# Patient Record
Sex: MALE | Race: WHITE | Employment: OTHER | URBAN - METROPOLITAN AREA
[De-identification: names, ages, dates, MRNs, and addresses within clinical notes are randomized per-mention and may not be internally consistent; named-entity substitution may affect disease eponyms.]

---

## 2019-08-21 ENCOUNTER — APPOINTMENT (OUTPATIENT)
Dept: GENERAL RADIOLOGY | Facility: HOSPITAL | Age: 59
End: 2019-08-21
Payer: MEDICAID

## 2019-08-21 ENCOUNTER — HOSPITAL ENCOUNTER (EMERGENCY)
Facility: HOSPITAL | Age: 59
Discharge: HOME | End: 2019-08-21
Attending: EMERGENCY MEDICINE
Payer: MEDICAID

## 2019-08-21 ENCOUNTER — APPOINTMENT (OUTPATIENT)
Dept: MRI IMAGING | Facility: HOSPITAL | Age: 59
End: 2019-08-21
Attending: EMERGENCY MEDICINE
Payer: MEDICAID

## 2019-08-21 VITALS
DIASTOLIC BLOOD PRESSURE: 94 MMHG | BODY MASS INDEX: 23.8 KG/M2 | RESPIRATION RATE: 31 BRPM | OXYGEN SATURATION: 96 % | SYSTOLIC BLOOD PRESSURE: 172 MMHG | HEIGHT: 71 IN | TEMPERATURE: 98 F | HEART RATE: 70 BPM | WEIGHT: 170 LBS

## 2019-08-21 DIAGNOSIS — F10.20 ALCOHOLISM (HCC): ICD-10-CM

## 2019-08-21 DIAGNOSIS — H53.2 DOUBLE VISION: Primary | ICD-10-CM

## 2019-08-21 DIAGNOSIS — I10 HYPERTENSION, UNSPECIFIED TYPE: ICD-10-CM

## 2019-08-21 LAB
ANION GAP SERPL CALC-SCNC: 9 MMOL/L (ref 0–18)
BASOPHILS # BLD AUTO: 0.04 X10(3) UL (ref 0–0.2)
BASOPHILS NFR BLD AUTO: 0.4 %
BUN BLD-MCNC: 10 MG/DL (ref 7–18)
BUN/CREAT SERPL: 11.2 (ref 10–20)
CALCIUM BLD-MCNC: 8.8 MG/DL (ref 8.5–10.1)
CHLORIDE SERPL-SCNC: 93 MMOL/L (ref 98–112)
CO2 SERPL-SCNC: 26 MMOL/L (ref 21–32)
CREAT BLD-MCNC: 0.89 MG/DL (ref 0.7–1.3)
DEPRECATED RDW RBC AUTO: 42.3 FL (ref 35.1–46.3)
EOSINOPHIL # BLD AUTO: 0.24 X10(3) UL (ref 0–0.7)
EOSINOPHIL NFR BLD AUTO: 2.4 %
ERYTHROCYTE [DISTWIDTH] IN BLOOD BY AUTOMATED COUNT: 12.1 % (ref 11–15)
GLUCOSE BLD-MCNC: 109 MG/DL (ref 70–99)
HCT VFR BLD AUTO: 38.1 % (ref 39–53)
HGB BLD-MCNC: 13.3 G/DL (ref 13–17.5)
IMM GRANULOCYTES # BLD AUTO: 0.04 X10(3) UL (ref 0–1)
IMM GRANULOCYTES NFR BLD: 0.4 %
LYMPHOCYTES # BLD AUTO: 1.53 X10(3) UL (ref 1–4)
LYMPHOCYTES NFR BLD AUTO: 15.1 %
MCH RBC QN AUTO: 32.8 PG (ref 26–34)
MCHC RBC AUTO-ENTMCNC: 34.9 G/DL (ref 31–37)
MCV RBC AUTO: 93.8 FL (ref 80–100)
MONOCYTES # BLD AUTO: 1.22 X10(3) UL (ref 0.1–1)
MONOCYTES NFR BLD AUTO: 12.1 %
NEUTROPHILS # BLD AUTO: 7.04 X10 (3) UL (ref 1.5–7.7)
NEUTROPHILS # BLD AUTO: 7.04 X10(3) UL (ref 1.5–7.7)
NEUTROPHILS NFR BLD AUTO: 69.6 %
OSMOLALITY SERPL CALC.SUM OF ELEC: 266 MOSM/KG (ref 275–295)
PLATELET # BLD AUTO: 276 10(3)UL (ref 150–450)
POTASSIUM SERPL-SCNC: 3.7 MMOL/L (ref 3.5–5.1)
RBC # BLD AUTO: 4.06 X10(6)UL (ref 4.3–5.7)
SODIUM SERPL-SCNC: 128 MMOL/L (ref 136–145)
TROPONIN I SERPL-MCNC: <0.045 NG/ML (ref ?–0.04)
WBC # BLD AUTO: 10.1 X10(3) UL (ref 4–11)

## 2019-08-21 PROCEDURE — 99285 EMERGENCY DEPT VISIT HI MDM: CPT

## 2019-08-21 PROCEDURE — 80048 BASIC METABOLIC PNL TOTAL CA: CPT | Performed by: EMERGENCY MEDICINE

## 2019-08-21 PROCEDURE — 85025 COMPLETE CBC W/AUTO DIFF WBC: CPT | Performed by: EMERGENCY MEDICINE

## 2019-08-21 PROCEDURE — 71046 X-RAY EXAM CHEST 2 VIEWS: CPT | Performed by: EMERGENCY MEDICINE

## 2019-08-21 PROCEDURE — 84484 ASSAY OF TROPONIN QUANT: CPT | Performed by: EMERGENCY MEDICINE

## 2019-08-21 PROCEDURE — 85025 COMPLETE CBC W/AUTO DIFF WBC: CPT

## 2019-08-21 PROCEDURE — 36415 COLL VENOUS BLD VENIPUNCTURE: CPT

## 2019-08-21 PROCEDURE — 93005 ELECTROCARDIOGRAM TRACING: CPT

## 2019-08-21 PROCEDURE — 70551 MRI BRAIN STEM W/O DYE: CPT | Performed by: EMERGENCY MEDICINE

## 2019-08-21 PROCEDURE — 93010 ELECTROCARDIOGRAM REPORT: CPT | Performed by: EMERGENCY MEDICINE

## 2019-08-21 RX ORDER — HYDROCHLOROTHIAZIDE 25 MG/1
25 TABLET ORAL DAILY
Qty: 14 TABLET | Refills: 0 | Status: ON HOLD | OUTPATIENT
Start: 2019-08-21 | End: 2019-12-07

## 2019-08-21 NOTE — ED INITIAL ASSESSMENT (HPI)
Pt with c/o blurry vision, being unstable and high blood pressure since Monday. Per family member he's been more confused for the past couple days.

## 2019-08-21 NOTE — ED PROVIDER NOTES
Patient Seen in: [facility] AND [facility] Emergency Department    History   Patient presents with:  Hypertension (cardiovascular)    Stated Complaint: htn    HPI    Patient presents now with symptoms for the past 3 days.   He first noticed this 2 days ago where Current:BP (!) 160/92   Pulse 73   Temp 98.1 °F (36.7 °C) (Oral)   Resp 22   Ht 180.3 cm (5' 11\")   Wt 77.1 kg   SpO2 97%   BMI 23.71 kg/m²         Physical Exam  Constitutional:  Alert, well nourished adult lying in bed in no distress.   Vital signs on a two-week course but I emphasized that this is best done through her primary care physician. The son does have a primary care physician is going to call tomorrow and see if he can get his father into.   I discussed reasons to return the patient is sitt [facility]    Disposition:  Discharge    Follow-up:  Yuri Covarrubias MD  [facility]  [phone]    In 2 days  For re-check    Perez Gil MD  [address] [phone]    In 2 days  F

## 2019-12-02 ENCOUNTER — APPOINTMENT (OUTPATIENT)
Dept: GENERAL RADIOLOGY | Facility: HOSPITAL | Age: 59
DRG: 199 | End: 2019-12-02
Payer: MEDICAID

## 2019-12-02 ENCOUNTER — HOSPITAL ENCOUNTER (INPATIENT)
Facility: HOSPITAL | Age: 59
LOS: 5 days | Discharge: HOME | DRG: 199 | End: 2019-12-07
Admitting: HOSPITALIST
Payer: MEDICAID

## 2019-12-02 DIAGNOSIS — J94.8 HYDROPNEUMOTHORAX: ICD-10-CM

## 2019-12-02 DIAGNOSIS — S22.41XA CLOSED FRACTURE OF MULTIPLE RIBS OF RIGHT SIDE, INITIAL ENCOUNTER: Primary | ICD-10-CM

## 2019-12-02 LAB
ALBUMIN SERPL-MCNC: 3.6 G/DL (ref 3.4–5)
ALBUMIN/GLOB SERPL: 0.9 {RATIO} (ref 1–2)
ALP LIVER SERPL-CCNC: 72 U/L (ref 45–117)
ALT SERPL-CCNC: 14 U/L (ref 16–61)
ANION GAP SERPL CALC-SCNC: 6 MMOL/L (ref 0–18)
ANION GAP SERPL CALC-SCNC: 8 MMOL/L (ref 0–18)
APTT PPP: 34.3 SECONDS (ref 23.2–35.3)
AST SERPL-CCNC: 12 U/L (ref 15–37)
BASOPHILS # BLD AUTO: 0.05 X10(3) UL (ref 0–0.2)
BASOPHILS NFR BLD AUTO: 0.4 %
BILIRUB SERPL-MCNC: 0.3 MG/DL (ref 0.1–2)
BUN BLD-MCNC: 30 MG/DL (ref 7–18)
BUN BLD-MCNC: 32 MG/DL (ref 7–18)
BUN/CREAT SERPL: 23.8 (ref 10–20)
BUN/CREAT SERPL: 27.6 (ref 10–20)
CALCIUM BLD-MCNC: 9.1 MG/DL (ref 8.5–10.1)
CALCIUM BLD-MCNC: 9.1 MG/DL (ref 8.5–10.1)
CHLORIDE SERPL-SCNC: 100 MMOL/L (ref 98–112)
CHLORIDE SERPL-SCNC: 101 MMOL/L (ref 98–112)
CO2 SERPL-SCNC: 25 MMOL/L (ref 21–32)
CO2 SERPL-SCNC: 26 MMOL/L (ref 21–32)
CREAT BLD-MCNC: 1.16 MG/DL (ref 0.7–1.3)
CREAT BLD-MCNC: 1.26 MG/DL (ref 0.7–1.3)
DEPRECATED RDW RBC AUTO: 43.3 FL (ref 35.1–46.3)
EOSINOPHIL # BLD AUTO: 0.36 X10(3) UL (ref 0–0.7)
EOSINOPHIL NFR BLD AUTO: 2.9 %
ERYTHROCYTE [DISTWIDTH] IN BLOOD BY AUTOMATED COUNT: 12.8 % (ref 11–15)
GLOBULIN PLAS-MCNC: 3.8 G/DL (ref 2.8–4.4)
GLUCOSE BLD-MCNC: 135 MG/DL (ref 70–99)
GLUCOSE BLD-MCNC: 98 MG/DL (ref 70–99)
HAV IGM SER QL: 2.3 MG/DL (ref 1.6–2.6)
HCT VFR BLD AUTO: 33.7 % (ref 39–53)
HGB BLD-MCNC: 11.8 G/DL (ref 13–17.5)
IMM GRANULOCYTES # BLD AUTO: 0.1 X10(3) UL (ref 0–1)
IMM GRANULOCYTES NFR BLD: 0.8 %
INR BLD: 1.13 (ref 0.9–1.2)
LYMPHOCYTES # BLD AUTO: 0.47 X10(3) UL (ref 1–4)
LYMPHOCYTES NFR BLD AUTO: 3.8 %
M PROTEIN MFR SERPL ELPH: 7.4 G/DL (ref 6.4–8.2)
MCH RBC QN AUTO: 32.3 PG (ref 26–34)
MCHC RBC AUTO-ENTMCNC: 35 G/DL (ref 31–37)
MCV RBC AUTO: 92.3 FL (ref 80–100)
MONOCYTES # BLD AUTO: 0.73 X10(3) UL (ref 0.1–1)
MONOCYTES NFR BLD AUTO: 5.9 %
MRSA DNA SPEC QL NAA+PROBE: NEGATIVE
NEUTROPHILS # BLD AUTO: 10.57 X10 (3) UL (ref 1.5–7.7)
NEUTROPHILS # BLD AUTO: 10.57 X10(3) UL (ref 1.5–7.7)
NEUTROPHILS NFR BLD AUTO: 86.2 %
OSMOLALITY SERPL CALC.SUM OF ELEC: 282 MOSM/KG (ref 275–295)
OSMOLALITY SERPL CALC.SUM OF ELEC: 285 MOSM/KG (ref 275–295)
PLATELET # BLD AUTO: 366 10(3)UL (ref 150–450)
POTASSIUM SERPL-SCNC: 3.3 MMOL/L (ref 3.5–5.1)
POTASSIUM SERPL-SCNC: 3.5 MMOL/L (ref 3.5–5.1)
PROTHROMBIN TIME: 14.4 SECONDS (ref 11.8–14.5)
RBC # BLD AUTO: 3.65 X10(6)UL (ref 4.3–5.7)
SODIUM SERPL-SCNC: 132 MMOL/L (ref 136–145)
SODIUM SERPL-SCNC: 134 MMOL/L (ref 136–145)
WBC # BLD AUTO: 12.3 X10(3) UL (ref 4–11)

## 2019-12-02 PROCEDURE — 71101 X-RAY EXAM UNILAT RIBS/CHEST: CPT

## 2019-12-02 PROCEDURE — 99223 1ST HOSP IP/OBS HIGH 75: CPT | Performed by: HOSPITALIST

## 2019-12-02 PROCEDURE — 99254 IP/OBS CNSLTJ NEW/EST MOD 60: CPT | Performed by: INTERNAL MEDICINE

## 2019-12-02 RX ORDER — MULTIPLE VITAMINS W/ MINERALS TAB 9MG-400MCG
1 TAB ORAL DAILY
Status: DISCONTINUED | OUTPATIENT
Start: 2019-12-02 | End: 2019-12-07

## 2019-12-02 RX ORDER — MORPHINE SULFATE 4 MG/ML
4 INJECTION, SOLUTION INTRAMUSCULAR; INTRAVENOUS ONCE
Status: COMPLETED | OUTPATIENT
Start: 2019-12-02 | End: 2019-12-02

## 2019-12-02 RX ORDER — ALBUTEROL SULFATE 2.5 MG/3ML
2.5 SOLUTION RESPIRATORY (INHALATION) EVERY 6 HOURS PRN
Status: DISCONTINUED | OUTPATIENT
Start: 2019-12-02 | End: 2019-12-07

## 2019-12-02 RX ORDER — SODIUM CHLORIDE 0.9 % (FLUSH) 0.9 %
3 SYRINGE (ML) INJECTION AS NEEDED
Status: DISCONTINUED | OUTPATIENT
Start: 2019-12-02 | End: 2019-12-07

## 2019-12-02 RX ORDER — ACETAMINOPHEN 325 MG/1
650 TABLET ORAL EVERY 6 HOURS PRN
Status: DISCONTINUED | OUTPATIENT
Start: 2019-12-02 | End: 2019-12-07

## 2019-12-02 RX ORDER — FOLIC ACID 1 MG/1
1 TABLET ORAL DAILY
Status: DISCONTINUED | OUTPATIENT
Start: 2019-12-02 | End: 2019-12-07

## 2019-12-02 RX ORDER — HYDROMORPHONE HYDROCHLORIDE 1 MG/ML
0.4 INJECTION, SOLUTION INTRAMUSCULAR; INTRAVENOUS; SUBCUTANEOUS EVERY 2 HOUR PRN
Status: DISCONTINUED | OUTPATIENT
Start: 2019-12-02 | End: 2019-12-07

## 2019-12-02 RX ORDER — LORAZEPAM 1 MG/1
2 TABLET ORAL
Status: DISCONTINUED | OUTPATIENT
Start: 2019-12-02 | End: 2019-12-04

## 2019-12-02 RX ORDER — MELATONIN
100 ONCE
Status: COMPLETED | OUTPATIENT
Start: 2019-12-02 | End: 2019-12-02

## 2019-12-02 RX ORDER — MELATONIN
100 DAILY
Status: DISCONTINUED | OUTPATIENT
Start: 2019-12-03 | End: 2019-12-07

## 2019-12-02 RX ORDER — LORAZEPAM 2 MG/ML
2 INJECTION INTRAMUSCULAR
Status: DISCONTINUED | OUTPATIENT
Start: 2019-12-02 | End: 2019-12-04

## 2019-12-02 RX ORDER — ACETAMINOPHEN 325 MG/1
650 TABLET ORAL EVERY 4 HOURS PRN
Status: DISCONTINUED | OUTPATIENT
Start: 2019-12-02 | End: 2019-12-07

## 2019-12-02 RX ORDER — MORPHINE SULFATE 4 MG/ML
4 INJECTION, SOLUTION INTRAMUSCULAR; INTRAVENOUS EVERY 2 HOUR PRN
Status: DISCONTINUED | OUTPATIENT
Start: 2019-12-02 | End: 2019-12-07

## 2019-12-02 RX ORDER — MORPHINE SULFATE 2 MG/ML
2 INJECTION, SOLUTION INTRAMUSCULAR; INTRAVENOUS EVERY 2 HOUR PRN
Status: DISCONTINUED | OUTPATIENT
Start: 2019-12-02 | End: 2019-12-07

## 2019-12-02 RX ORDER — METOCLOPRAMIDE HYDROCHLORIDE 5 MG/ML
10 INJECTION INTRAMUSCULAR; INTRAVENOUS EVERY 8 HOURS PRN
Status: DISCONTINUED | OUTPATIENT
Start: 2019-12-02 | End: 2019-12-07

## 2019-12-02 RX ORDER — POTASSIUM CHLORIDE 20 MEQ/1
40 TABLET, EXTENDED RELEASE ORAL EVERY 4 HOURS
Status: COMPLETED | OUTPATIENT
Start: 2019-12-02 | End: 2019-12-02

## 2019-12-02 RX ORDER — HYDROCODONE BITARTRATE AND ACETAMINOPHEN 5; 325 MG/1; MG/1
1 TABLET ORAL EVERY 4 HOURS PRN
Status: DISCONTINUED | OUTPATIENT
Start: 2019-12-02 | End: 2019-12-07

## 2019-12-02 RX ORDER — ONDANSETRON 2 MG/ML
4 INJECTION INTRAMUSCULAR; INTRAVENOUS EVERY 6 HOURS PRN
Status: DISCONTINUED | OUTPATIENT
Start: 2019-12-02 | End: 2019-12-07

## 2019-12-02 RX ORDER — LORAZEPAM 1 MG/1
1 TABLET ORAL
Status: DISCONTINUED | OUTPATIENT
Start: 2019-12-02 | End: 2019-12-04

## 2019-12-02 RX ORDER — LORAZEPAM 2 MG/ML
1 INJECTION INTRAMUSCULAR
Status: DISCONTINUED | OUTPATIENT
Start: 2019-12-02 | End: 2019-12-04

## 2019-12-02 RX ORDER — HEPARIN SODIUM 5000 [USP'U]/ML
5000 INJECTION, SOLUTION INTRAVENOUS; SUBCUTANEOUS EVERY 12 HOURS SCHEDULED
Status: DISCONTINUED | OUTPATIENT
Start: 2019-12-02 | End: 2019-12-07

## 2019-12-02 RX ORDER — HYDROCODONE BITARTRATE AND ACETAMINOPHEN 5; 325 MG/1; MG/1
2 TABLET ORAL EVERY 4 HOURS PRN
Status: DISCONTINUED | OUTPATIENT
Start: 2019-12-02 | End: 2019-12-07

## 2019-12-02 RX ORDER — MORPHINE SULFATE 2 MG/ML
1 INJECTION, SOLUTION INTRAMUSCULAR; INTRAVENOUS EVERY 2 HOUR PRN
Status: DISCONTINUED | OUTPATIENT
Start: 2019-12-02 | End: 2019-12-07

## 2019-12-02 NOTE — ED NOTES
Pt reports falling outside on concrete and hitting R flank on concrete. Denies hitting head or LOC. Skin is intact. Pt complains of pain in R flank and R anterior ribs and pain with deep inspiration.

## 2019-12-02 NOTE — CONSULTS
Pulmonary/Critical Care Consultation Note    HPI:   Lashanda Ambrose is a 60year old male with Patient presents with:  Fall (musculoskeletal, neurologic)    Beny Teixeira MD    Pt is a 7o yo with no reg med care with alcohol and tob abuse who presented to Q2H PRN    Or  morphINE sulfate (PF) 4 MG/ML injection 4 mg, 4 mg, Intravenous, Q2H PRN  ondansetron HCl (ZOFRAN) injection 4 mg, 4 mg, Intravenous, Q6H PRN            Allergies:  No Known Allergies    Social History:  Social History    Socioeconomic Histo abuse  Plan PT/OT   Fall precautions    PTX  Very small  After fall with R side rib fx  5 days post fall and small ptx suggests ptx will not progress  Very small R side effusion  Plan     O2   Pain control   IS   CXR in AM   Follow    ETOH abuse  No hx of

## 2019-12-02 NOTE — ED INITIAL ASSESSMENT (HPI)
Patient \"blacked out\" Thursday night, striking the right side of his ribcage. State it now hurts to touch it or take a deep breath.

## 2019-12-02 NOTE — CONSULTS
[facility] Wendie Calero  JDY:[id number]  IMO:[id number]  LOS:0    Date of Admission:  12/2/2019  Date of Consult:  12/2/2019 pain  Cardiovascular: negative  Gastrointestinal: negative  Genitourinary:negative  Integument/breast: negative  Musculoskeletal:negative  Neurological: negative  Behavioral/Psych: positive for excessive alcohol consumption    Physical Exam:    12/02/19  1 12/02/2019 at 13:05     Approved by (CST): Saurav Knutson MD on 12/02/2019 at 13:14            Impression and Plan:   Patient Active Problem List:     Multiple closed fractures of ribs of right side    Blunt chest trauma, multiple rib fractures with small

## 2019-12-02 NOTE — H&P
[facility]    PATIENT'S NAME: Nithin Archer   ATTENDING PHYSICIAN: Rosana Goss MD   PATIENT ACCOUNT#:   [id number]    LOCATION:  [place] [address]  MEDICAL RECORD #:   [id number]       YOB: 1960  ADMISSION DATE:       12/02 area, upper thoracic area with increased shortness of breath. No wheezing, no cough. He cannot lie on his right side. Other 12-point review of systems negative. PHYSICAL EXAMINATION:    GENERAL:  Alert. Oriented to time, place, and person.   Antionette Kumari

## 2019-12-02 NOTE — ED PROVIDER NOTES
Patient Seen in: [facility] AND [facility] Emergency Department      History   Patient presents with:  Fall (musculoskeletal, neurologic)    Stated Complaint: fall    HPI    26-year-old male on lisinopril with alcoholism who reportedly fell backwards in his base with auscultated crackles and soft tissue crepitus throughout the right anterior lateral posterior rib area with pain to palpation. Abdominal: Soft. There is no tenderness. There is no guarding.   No organomegaly or focal right upper quadrant or left upper (cpt=71101)    Result Date: 12/2/2019  PROCEDURE: XR RIBS WITH CHEST (3 VIEWS), RIGHT (CPT=71101)  COMPARISON: [facility], XR CHEST PA + LAT CHEST (CPT=71046), 8/21/2019, 16:59. INDICATIONS: Mid upper right rib pain post fall 4 days ago. history. Family was updated. No chest tube at this time as well clinically monitor him for any worsening.         I spent a total of 30 minutes of critical care time in obtaining history, performing a physical exam, bedside monitoring of interventions, co

## 2019-12-03 ENCOUNTER — APPOINTMENT (OUTPATIENT)
Dept: GENERAL RADIOLOGY | Facility: HOSPITAL | Age: 59
DRG: 199 | End: 2019-12-03
Attending: SURGERY
Payer: MEDICAID

## 2019-12-03 LAB
ANION GAP SERPL CALC-SCNC: 6 MMOL/L (ref 0–18)
BASOPHILS # BLD AUTO: 0.06 X10(3) UL (ref 0–0.2)
BASOPHILS NFR BLD AUTO: 0.7 %
BUN BLD-MCNC: 35 MG/DL (ref 7–18)
BUN/CREAT SERPL: 26.7 (ref 10–20)
CALCIUM BLD-MCNC: 8.8 MG/DL (ref 8.5–10.1)
CHLORIDE SERPL-SCNC: 100 MMOL/L (ref 98–112)
CO2 SERPL-SCNC: 26 MMOL/L (ref 21–32)
CREAT BLD-MCNC: 1.31 MG/DL (ref 0.7–1.3)
DEPRECATED RDW RBC AUTO: 43.5 FL (ref 35.1–46.3)
EOSINOPHIL # BLD AUTO: 1.01 X10(3) UL (ref 0–0.7)
EOSINOPHIL NFR BLD AUTO: 11.1 %
ERYTHROCYTE [DISTWIDTH] IN BLOOD BY AUTOMATED COUNT: 12.8 % (ref 11–15)
GLUCOSE BLD-MCNC: 92 MG/DL (ref 70–99)
HAV IGM SER QL: 2.2 MG/DL (ref 1.6–2.6)
HCT VFR BLD AUTO: 31.7 % (ref 39–53)
HGB BLD-MCNC: 10.9 G/DL (ref 13–17.5)
IMM GRANULOCYTES # BLD AUTO: 0.08 X10(3) UL (ref 0–1)
IMM GRANULOCYTES NFR BLD: 0.9 %
LYMPHOCYTES # BLD AUTO: 1.69 X10(3) UL (ref 1–4)
LYMPHOCYTES NFR BLD AUTO: 18.5 %
MCH RBC QN AUTO: 32 PG (ref 26–34)
MCHC RBC AUTO-ENTMCNC: 34.4 G/DL (ref 31–37)
MCV RBC AUTO: 93 FL (ref 80–100)
MONOCYTES # BLD AUTO: 0.89 X10(3) UL (ref 0.1–1)
MONOCYTES NFR BLD AUTO: 9.8 %
NEUTROPHILS # BLD AUTO: 5.39 X10 (3) UL (ref 1.5–7.7)
NEUTROPHILS # BLD AUTO: 5.39 X10(3) UL (ref 1.5–7.7)
NEUTROPHILS NFR BLD AUTO: 59 %
OSMOLALITY SERPL CALC.SUM OF ELEC: 282 MOSM/KG (ref 275–295)
PHOSPHATE SERPL-MCNC: 2.9 MG/DL (ref 2.5–4.9)
PLATELET # BLD AUTO: 340 10(3)UL (ref 150–450)
POTASSIUM SERPL-SCNC: 4.3 MMOL/L (ref 3.5–5.1)
POTASSIUM SERPL-SCNC: 4.5 MMOL/L (ref 3.5–5.1)
RBC # BLD AUTO: 3.41 X10(6)UL (ref 4.3–5.7)
SODIUM SERPL-SCNC: 132 MMOL/L (ref 136–145)
WBC # BLD AUTO: 9.1 X10(3) UL (ref 4–11)

## 2019-12-03 PROCEDURE — 99232 SBSQ HOSP IP/OBS MODERATE 35: CPT | Performed by: INTERNAL MEDICINE

## 2019-12-03 PROCEDURE — 71046 X-RAY EXAM CHEST 2 VIEWS: CPT | Performed by: SURGERY

## 2019-12-03 PROCEDURE — 99233 SBSQ HOSP IP/OBS HIGH 50: CPT | Performed by: HOSPITALIST

## 2019-12-03 NOTE — PROGRESS NOTES
[facility]    Progress Note  Edson Dos Santos  [id number]  1    Subjective:   Denies complaints.  chest pain improved  Denies SOB, HA, N/V     Exam:     General: looks well, no acute distress, comfortable and in good spirits  Head: normocepha 12/03/2019    GLU 92 12/03/2019    BILT 0.3 12/02/2019    AST 12 12/02/2019    ALT 14 12/02/2019    INR 1.13 12/02/2019    PTT 34.3 12/02/2019    CA 8.8 12/03/2019    ALB 3.6 12/02/2019    TP 7.4 12/02/2019         Xr Ribs With Chest (3 Views), Right  (cpt

## 2019-12-03 NOTE — PLAN OF CARE
Problem: Patient Centered Care  Goal: Patient preferences are identified and integrated in the patient's plan of care  Description  Interventions:  - What would you like us to know as we care for you? My son helps me at home.   - Provide timely, complete, Problem: MUSCULOSKELETAL - ADULT  Goal: Return mobility to safest level of function  Description  INTERVENTIONS:  - Assess patient stability and activity tolerance for standing, transferring and ambulating w/ or w/o assistive devices  - Assist with trans

## 2019-12-03 NOTE — PLAN OF CARE
Patient alert and oriented, VSS. Gets up to bathroom and uses urinal. No BM today. Scds on. Complaints of rib pain, treated with Norco. Feels no need/refusing to detox inpatient. CIWA controlled. No ativan given. Tolerating diet. Family at bedside.  Lazarus Heys handout, if applicable  - Encourage broncho-pulmonary hygiene including cough, deep breathe, Incentive Spirometry  - Assess the need for suctioning and perform as needed  - Assess and instruct to report SOB or any respiratory difficulty  - Respiratory Ther

## 2019-12-03 NOTE — PROGRESS NOTES
Received patient from [facility] and alert. c/o pain prn pain medication given. denies any sob. vitals in limit.skin is intact. pt able to swallow without any difficulty. vitals in limit. pt son at bed side. updated pt status. will continue to monitor.

## 2019-12-03 NOTE — DIETARY NOTE
ADULT NUTRITION INITIAL ASSESSMENT    Pt is at high nutrition risk. Pt meets severe malnutrition criteria.       CRITERIA FOR MALNUTRITION DIAGNOSIS:  Criteria for severe malnutrition diagnosis: acute illness/injury related to wt loss greater than 7.5% as pt states knows how to do. Recommend continue nutrition supplements upon discharge. - Meals and snacks: Encouraged adequate PO intake and encouraged high jerry/protein supplements between meals.   - Medical Food Supplements-RD added Ensure Enlive BID.  R HYDROcodone-acetaminophen, morphINE sulfate **OR** morphINE sulfate **OR** morphINE sulfate, ondansetron HCl, albuterol sulfate, LORazepam **OR** LORazepam **OR** LORazepam **OR** LORazepam, Metoclopramide HCl    • Heparin Sodium (Porcine)  5,000 Units Sub within 5 days   Cesar Choi RD, LDN, [address]  ([id number])

## 2019-12-03 NOTE — PROGRESS NOTES
[facility]     Progress Note        Aureliano Marcelino Patient Status:  Inpatient    1960 MRN [id number]   [facility] 2W/SW Attending Rubens Ayala Jolly Day # 1 PCP Whitney Jones MD       Subjective:   Pa PRN  LORazepam (ATIVAN) tab 1 mg, 1 mg, Oral, Q1H PRN    Or  LORazepam (ATIVAN) injection 1 mg, 1 mg, Intravenous, Q1H PRN    Or  LORazepam (ATIVAN) tab 2 mg, 2 mg, Oral, Q1H PRN    Or  LORazepam (ATIVAN) injection 2 mg, 2 mg, Intravenous, Q1H PRN  Thiamin 13:05     Approved by (CST): Jean Fletcher MD on 12/02/2019 at 13:14                  Assessment   1. Right pneumothorax  2. Small right pleural effusion  3. Status post fall  4. Multiple right-sided rib fractures  5. EtOH abuse  6.   Hypertension

## 2019-12-04 ENCOUNTER — APPOINTMENT (OUTPATIENT)
Dept: GENERAL RADIOLOGY | Facility: HOSPITAL | Age: 59
DRG: 199 | End: 2019-12-04
Attending: HOSPITALIST
Payer: MEDICAID

## 2019-12-04 LAB
ANION GAP SERPL CALC-SCNC: 5 MMOL/L (ref 0–18)
BASOPHILS # BLD AUTO: 0.03 X10(3) UL (ref 0–0.2)
BASOPHILS NFR BLD AUTO: 0.4 %
BUN BLD-MCNC: 25 MG/DL (ref 7–18)
BUN/CREAT SERPL: 26.6 (ref 10–20)
CALCIUM BLD-MCNC: 9 MG/DL (ref 8.5–10.1)
CHLORIDE SERPL-SCNC: 96 MMOL/L (ref 98–112)
CO2 SERPL-SCNC: 28 MMOL/L (ref 21–32)
CREAT BLD-MCNC: 0.94 MG/DL (ref 0.7–1.3)
DEPRECATED RDW RBC AUTO: 41.7 FL (ref 35.1–46.3)
EOSINOPHIL # BLD AUTO: 0.66 X10(3) UL (ref 0–0.7)
EOSINOPHIL NFR BLD AUTO: 8.2 %
ERYTHROCYTE [DISTWIDTH] IN BLOOD BY AUTOMATED COUNT: 12.5 % (ref 11–15)
GLUCOSE BLD-MCNC: 103 MG/DL (ref 70–99)
HAV IGM SER QL: 2 MG/DL (ref 1.6–2.6)
HCT VFR BLD AUTO: 32 % (ref 39–53)
HGB BLD-MCNC: 10.8 G/DL (ref 13–17.5)
IMM GRANULOCYTES # BLD AUTO: 0.06 X10(3) UL (ref 0–1)
IMM GRANULOCYTES NFR BLD: 0.7 %
LYMPHOCYTES # BLD AUTO: 0.89 X10(3) UL (ref 1–4)
LYMPHOCYTES NFR BLD AUTO: 11.1 %
MCH RBC QN AUTO: 31 PG (ref 26–34)
MCHC RBC AUTO-ENTMCNC: 33.8 G/DL (ref 31–37)
MCV RBC AUTO: 92 FL (ref 80–100)
MONOCYTES # BLD AUTO: 0.78 X10(3) UL (ref 0.1–1)
MONOCYTES NFR BLD AUTO: 9.7 %
NEUTROPHILS # BLD AUTO: 5.61 X10 (3) UL (ref 1.5–7.7)
NEUTROPHILS # BLD AUTO: 5.61 X10(3) UL (ref 1.5–7.7)
NEUTROPHILS NFR BLD AUTO: 69.9 %
OSMOLALITY SERPL CALC.SUM OF ELEC: 273 MOSM/KG (ref 275–295)
PHOSPHATE SERPL-MCNC: 3.1 MG/DL (ref 2.5–4.9)
PLATELET # BLD AUTO: 349 10(3)UL (ref 150–450)
POTASSIUM SERPL-SCNC: 4 MMOL/L (ref 3.5–5.1)
RBC # BLD AUTO: 3.48 X10(6)UL (ref 4.3–5.7)
SODIUM SERPL-SCNC: 129 MMOL/L (ref 136–145)
WBC # BLD AUTO: 8 X10(3) UL (ref 4–11)

## 2019-12-04 PROCEDURE — 99233 SBSQ HOSP IP/OBS HIGH 50: CPT | Performed by: HOSPITALIST

## 2019-12-04 PROCEDURE — 71045 X-RAY EXAM CHEST 1 VIEW: CPT | Performed by: HOSPITALIST

## 2019-12-04 PROCEDURE — 99233 SBSQ HOSP IP/OBS HIGH 50: CPT | Performed by: INTERNAL MEDICINE

## 2019-12-04 NOTE — PROGRESS NOTES
[facility]    Progress Note    Pattie Likes Patient Status:  Inpatient    1960 MRN [id number]   Location [facility] 5SW/SE Attending Juliet Prescott, [address] Day # 2 PCP Redd Kong MD       Subjective:   Vickey Marrero PRN  ondansetron HCl (ZOFRAN) injection 4 mg, 4 mg, Intravenous, Q6H PRN  albuterol sulfate (VENTOLIN) (2.5 MG/3ML) 0.083% nebulizer solution 2.5 mg, 2.5 mg, Nebulization, Q6H PRN  LORazepam (ATIVAN) tab 1 mg, 1 mg, Oral, Q1H PRN    Or  LORazepam (ATIVAN) effusions/hydropneumothorax with improved atelectasis. 3. Subcutaneous emphysema without a significant change. 4. Old healed left rib fractures and distal left clavicle fracture.     Dictated by (CST): Kushal Pearce MD on 12/04/2019 at 7:53     Approved by

## 2019-12-04 NOTE — PROGRESS NOTES
Patient to transfer from 215 to 561. Report given to John Lorna @ [id number]. Medications, labs, plan of care reviewed. All belongings with patient. Nursing provided patient with current list of medications; reviewed purpose and side effects of medications.  Jericho stevenson

## 2019-12-04 NOTE — PROGRESS NOTES
Pulmonary/Critical Care Follow Up Note    HPI:   Jo Patterson is a 60year old male with Patient presents with:  Fall      PCP Daily Burgos MD  Admission Attending Eun Humphries MD    Hospital Day #2    Mild chest discomfort  No sob  No fever  Occ c mg, 100 mg, Oral, Daily  •  multivitamin with minerals (ADULT) tab 1 tablet, 1 tablet, Oral, Daily  •  folic acid (FOLVITE) tab 1 mg, 1 mg, Oral, Daily  •  Metoclopramide HCl (REGLAN) injection 10 mg, 10 mg, Intravenous, Q8H PRN      Allergies:  No Known A

## 2019-12-04 NOTE — PLAN OF CARE
Patient educated and reminded frequently to use incentive spirometry and to cough and deep breathe frequently.        Problem: Patient Centered Care  Goal: Patient preferences are identified and integrated in the patient's plan of care  Description  Interve indicated  - Manage/alleviate anxiety  - Monitor for signs/symptoms of CO2 retention  Outcome: Progressing     Problem: MUSCULOSKELETAL - ADULT  Goal: Return mobility to safest level of function  Description  INTERVENTIONS:  - Assess patient stability and

## 2019-12-04 NOTE — PROGRESS NOTES
[place] [facility]    Progress Note  [facility]  [id number]  2    Subjective:   Denies complaints.  chest pain improved  Denies SOB, HA, N/V     Exam:     General: looks well, no acute distress, comfortable and in good spirits  Head: normocepha 12/04/2019    CA 9.0 12/04/2019         Xr Chest Pa + Lat Chest (cpt=71046)    Result Date: 12/3/2019  CONCLUSION:  1. Multiple right-sided rib fractures with approximately 20% right-sided pneumothorax (unchanged) and small right basilar pleural effusion/h

## 2019-12-04 NOTE — PAYOR COMM NOTE
--------------  ADMISSION REVIEW     Payor: Ela Whitt #:  [id number]  Authorization Number: [id number]    Admit date: 12/2/19  Admit time: 1633       Admitting Physician: Dot Kramer MD  Attending Physician:  Maryln Denver, DO  Primary Car Device None (Room air)       Current:/82   Pulse 84   Temp 98.2 °F (36.8 °C)   Resp 17   Ht 180.3 cm (5' 11\")   Wt 74.8 kg   SpO2 95%   BMI 23.01 kg/m²          Physical Exam    Constitutional: Oriented to person, place, and time.   Appears well-deve PLATELET    Narrative: The following orders were created for panel order CBC WITH DIFFERENTIAL WITH PLATELET.   Procedure                               Abnormality         Status                     ---------                               ----------- fractures of the anterolateral left lower ribs.     Dictated by (CST): Sy Spangler MD on 12/02/2019 at 13:05     Approved by (CST): Sy Spangler MD on 12/02/2019 at 13:14                MDM     Patient very stable here and injury occurred for 5 days ag Agnes Gill MD   PATIENT ACCOUNT#:   [id number]    LOCATION:  [facility] 96 [address] [facility] 68  MEDICAL RECORD #:   [id number]       YOB: 1960  ADMISSION DATE:       12/02/2019    HISTORY AND PHYSICAL EXAMINATION    CHIEF COMPLAINT:  Multiple right-sided He cannot lie on his right side. Other 12-point review of systems negative. PHYSICAL EXAMINATION:    GENERAL:  Alert. Oriented to time, place, and person. Moderate distress.   VITAL SIGNS:  Temperature 98.2, pulse 79, respiratory rate 20, blood pres LAST 1 DAY:  folic acid (FOLVITE) tab 1 mg     Date Action Dose Route User    12/4/2019 0853 Given 1 mg Oral Daryl Sánchez, RN    12/3/2019 1005 Given 1 mg Oral Radha Coburn      Heparin Sodium (Porcine) 5000 UNIT/ML injection 5,000 Units     Date Ac and fell backwards on concrete but did not hurt his head. He is a heavy alcoholic according to his family and had been drinking that day. He was outside going around his building to go down and do laundry when he fell.   He has been complaining of chest p old male with Patient presents with:  Fall (musculoskeletal, neurologic)     Jan Danielle MD     Pt is a 7o yo with no reg med care with alcohol and tob abuse who presented to ER after fall on Thursday and c/o CP and SOB.  Pt was hoping pain would get bett following  Ambulation  DT prophylaxis     Objective:        12/03/19  0400 12/03/19  0500 12/03/19  0600 12/03/19  0624   BP: 141/86 141/81 132/80     Pulse: 74 71 68     Resp: 20 17 17     Temp: 98.1 °F (36.7 °C)         TempSrc:   Temporal       SpO2: 94

## 2019-12-04 NOTE — CM/SW NOTE
MDO to SW for substance abuse/ETOH. SW made referral to psych referral to [facility] via pt rn.    SW/CM to remain available for support and/or discharge planning.      KERRY López, [facility]  Social Work   XEA:#[phone]

## 2019-12-05 ENCOUNTER — APPOINTMENT (OUTPATIENT)
Dept: GENERAL RADIOLOGY | Facility: HOSPITAL | Age: 59
DRG: 199 | End: 2019-12-05
Attending: INTERNAL MEDICINE
Payer: MEDICAID

## 2019-12-05 PROCEDURE — 71045 X-RAY EXAM CHEST 1 VIEW: CPT | Performed by: INTERNAL MEDICINE

## 2019-12-05 PROCEDURE — 99232 SBSQ HOSP IP/OBS MODERATE 35: CPT | Performed by: INTERNAL MEDICINE

## 2019-12-05 PROCEDURE — 99232 SBSQ HOSP IP/OBS MODERATE 35: CPT | Performed by: HOSPITALIST

## 2019-12-05 RX ORDER — IBUPROFEN 600 MG/1
600 TABLET ORAL
Status: DISCONTINUED | OUTPATIENT
Start: 2019-12-05 | End: 2019-12-07

## 2019-12-05 NOTE — PROGRESS NOTES
[facility]    Progress Note    Ladonna  Patient Status:  Inpatient    1960 MRN [id number]   Location [facility] 5SW/SE Attending Niharika Lobo, [address] [facility] Day # 3 PCP Melissa Nunez MD       Subjective:   Shelby Snell injection 4 mg, 4 mg, Intravenous, Q2H PRN  ondansetron HCl (ZOFRAN) injection 4 mg, 4 mg, Intravenous, Q6H PRN  albuterol sulfate (VENTOLIN) (2.5 MG/3ML) 0.083% nebulizer solution 2.5 mg, 2.5 mg, Nebulization, Q6H PRN  Thiamine HCl tab 100 mg, 100 mg, Ora clavicle fracture.     Dictated by (CST): Roz Waite MD on 12/04/2019 at 7:53     Approved by (CST): Roz Waite MD on 12/04/2019 at 8:00                Results:     CBC:    Lab Results   Component Value Date    WBC 8.0 12/04/2019    WBC 9.1 12/03/20

## 2019-12-05 NOTE — PROGRESS NOTES
Pulmonary/Critical Care Follow Up Note    HPI:   Darek Matos is a 60year old male with Patient presents with:  Fall      PCP Davion Jackson MD  Admission Attending Felton Pinedo MD    Hospital Day #3    Mild chest discomfort  No sob  No fever  Occ c injection 10 mg, 10 mg, Intravenous, Q8H PRN      Allergies:  No Known Allergies        PHYSICAL EXAM:   Blood pressure 144/74, pulse 78, temperature 98.5 °F (36.9 °C), temperature source Oral, resp. rate 18, height 5' 10.98\" (1.803 m), weight 147 lb 14. 4

## 2019-12-05 NOTE — PROGRESS NOTES
[facility]    Progress Note  Vidya Karbrianda  [id number]  3    Subjective:   Denies complaints.  chest pain minimal  Taking Norco twice daily  Denies SOB, HA, N/V     Exam:     General: looks well, no acute distress, comfortable and in good related to increase in pleural effusion and atelectasis. 2. Old left-sided rib fractures.     Dictated by (CST): Eugene Mclean MD on 12/03/2019 at 16:07     Approved by (CST): Eugene Mclean MD on 12/03/2019 at 16:29          Xr Chest Ap Portable  (cpt=710

## 2019-12-06 ENCOUNTER — APPOINTMENT (OUTPATIENT)
Dept: GENERAL RADIOLOGY | Facility: HOSPITAL | Age: 59
DRG: 199 | End: 2019-12-06
Attending: INTERNAL MEDICINE
Payer: MEDICAID

## 2019-12-06 PROCEDURE — 99231 SBSQ HOSP IP/OBS SF/LOW 25: CPT | Performed by: INTERNAL MEDICINE

## 2019-12-06 PROCEDURE — 71045 X-RAY EXAM CHEST 1 VIEW: CPT | Performed by: INTERNAL MEDICINE

## 2019-12-06 PROCEDURE — 99232 SBSQ HOSP IP/OBS MODERATE 35: CPT | Performed by: HOSPITALIST

## 2019-12-06 NOTE — CM/SW NOTE
MDO for psych consult for ETOH/Substance abuse. Yehuda Soulier liaison contacted and she will follow up with pt. / to remain available for support and/or discharge planning.      Mago Blake RN    Ext [phone]

## 2019-12-06 NOTE — PROGRESS NOTES
[place] [facility]    Progress Note  Madeline Garcia  [id number]  4    Subjective:   Denies complaints.  chest pain minimal  Denies SOB, HA, N/V   + BM    Exam:     General: looks well, no acute distress, comfortable and in good spirits  Head: normo 12/6/2019  CONCLUSION: Multiple right lateral rib fractures are redemonstrated with stable right basal pleural effusion and there is a stable 15 % right hemithorax pneumothorax. Subcutaneous emphysema over the right lateral chest wall has improved.    Dict

## 2019-12-06 NOTE — PLAN OF CARE
Problem: Patient Centered Care  Goal: Patient preferences are identified and integrated in the patient's plan of care  Description  Interventions:  - What would you like us to know as we care for you? My son helps me at home.   - Provide timely, complete, patient is on 4L O2 via high flow NC as part of the treatment for his pneumothorax. Per MD, patient's SpO2 on room air is good, but the patient needs frequent reminders to keep the nasal cannula on. Tubing was padded for comfort.   Problem: MUSCULOSKELETAL

## 2019-12-06 NOTE — PROGRESS NOTES
Pulmonary/Critical Care Follow Up Note    HPI:   Edinson Lopez is a 60year old male with Patient presents with:  Fall      PCP Yanick Tate MD  Admission Attending Zeny Chacon MD    Hospital Day #4    Mild chest discomfort  No sob  No fever  Occ c injection 10 mg, 10 mg, Intravenous, Q8H PRN      Allergies:  No Known Allergies        PHYSICAL EXAM:   Blood pressure (!) 170/85, pulse 72, temperature 97.3 °F (36.3 °C), temperature source Oral, resp.  rate 16, height 5' 10.98\" (1.803 m), weight 147 lb

## 2019-12-06 NOTE — PROGRESS NOTES
Patient requested to wear jeans while walking in hallway because he was cold. A bottle of lisinopril was found in patient's renu pocket, medication removed from room and locked in .  Note added to patient's care chart about medication and current loca

## 2019-12-06 NOTE — BH PROGRESS NOTE
Rajwinder Alaniz briefly met with Belinda Steele and his family to discuss the provided referrals for CD treatment centers and outpatient therapists that are all in network with his insurance.     Rajwinder Alaniz compiled list of six CD treatment centers that have all lev

## 2019-12-06 NOTE — PROGRESS NOTES
[place] [facility]    Progress Note    Madeline Garcia Patient Status:  Inpatient    1960 MRN [id number]   Location [facility] 5SW/SE Attending Vi Trejo, [address] Day # 4 PCP Danielle Gonzalez MD       Subjective:   Cristel Rice sulfate (PF) 4 MG/ML injection 4 mg, 4 mg, Intravenous, Q2H PRN  ondansetron HCl (ZOFRAN) injection 4 mg, 4 mg, Intravenous, Q6H PRN  albuterol sulfate (VENTOLIN) (2.5 MG/3ML) 0.083% nebulizer solution 2.5 mg, 2.5 mg, Nebulization, Q6H PRN  Thiamine HCl ta MD on 12/05/2019 at 11:03     Approved by (CST): Christi Carrillo MD on 12/05/2019 at 11:07                Results:     CBC:    Lab Results   Component Value Date    WBC 8.0 12/04/2019    WBC 9.1 12/03/2019    WBC 12.3 (H) 12/02/2019     Lab Results   C

## 2019-12-06 NOTE — PHYSICAL THERAPY NOTE
PHYSICAL THERAPY QUICK EVALUATION - INPATIENT    Room Number: 561/561-A  Evaluation Date: 12/6/2019  Presenting Problem: R Rib fractures: 3rd-9th ribs with pneumothorax  Physician Order: PT Eval and Treat    Problem List  Principal Problem:    Closed fra None   -   Need to walk in hospital room?: None   -   Climbing 3-5 steps with a railing?: None       AM-PAC Score:  Raw Score: 24   Approx Degree of Impairment: 0%   Standardized Score (AM-PAC Scale): 61.14   CMS Modifier (G-Code): CH      FUNCTIONAL ABILI

## 2019-12-07 ENCOUNTER — APPOINTMENT (OUTPATIENT)
Dept: GENERAL RADIOLOGY | Facility: HOSPITAL | Age: 59
DRG: 199 | End: 2019-12-07
Attending: INTERNAL MEDICINE
Payer: MEDICAID

## 2019-12-07 VITALS
BODY MASS INDEX: 20.7 KG/M2 | HEIGHT: 70.98 IN | SYSTOLIC BLOOD PRESSURE: 146 MMHG | DIASTOLIC BLOOD PRESSURE: 84 MMHG | HEART RATE: 87 BPM | RESPIRATION RATE: 18 BRPM | OXYGEN SATURATION: 96 % | WEIGHT: 147.88 LBS | TEMPERATURE: 98 F

## 2019-12-07 LAB
ANION GAP SERPL CALC-SCNC: 8 MMOL/L (ref 0–18)
BUN BLD-MCNC: 18 MG/DL (ref 7–18)
BUN/CREAT SERPL: 22 (ref 10–20)
CALCIUM BLD-MCNC: 9 MG/DL (ref 8.5–10.1)
CHLORIDE SERPL-SCNC: 100 MMOL/L (ref 98–112)
CO2 SERPL-SCNC: 25 MMOL/L (ref 21–32)
CREAT BLD-MCNC: 0.82 MG/DL (ref 0.7–1.3)
GLUCOSE BLD-MCNC: 102 MG/DL (ref 70–99)
OSMOLALITY SERPL CALC.SUM OF ELEC: 278 MOSM/KG (ref 275–295)
POTASSIUM SERPL-SCNC: 4.5 MMOL/L (ref 3.5–5.1)
SODIUM SERPL-SCNC: 133 MMOL/L (ref 136–145)

## 2019-12-07 PROCEDURE — 71045 X-RAY EXAM CHEST 1 VIEW: CPT | Performed by: INTERNAL MEDICINE

## 2019-12-07 PROCEDURE — 99232 SBSQ HOSP IP/OBS MODERATE 35: CPT | Performed by: INTERNAL MEDICINE

## 2019-12-07 PROCEDURE — 99239 HOSP IP/OBS DSCHRG MGMT >30: CPT | Performed by: HOSPITALIST

## 2019-12-07 RX ORDER — MELATONIN
100 DAILY
Qty: 30 TABLET | Refills: 0 | Status: SHIPPED | OUTPATIENT
Start: 2019-12-08

## 2019-12-07 RX ORDER — ALBUTEROL SULFATE 90 UG/1
AEROSOL, METERED RESPIRATORY (INHALATION)
Qty: 1 INHALER | Refills: 5 | Status: SHIPPED | OUTPATIENT
Start: 2019-12-07

## 2019-12-07 RX ORDER — HYDROCODONE BITARTRATE AND ACETAMINOPHEN 5; 325 MG/1; MG/1
1 TABLET ORAL EVERY 4 HOURS PRN
Qty: 30 TABLET | Refills: 0 | Status: SHIPPED | OUTPATIENT
Start: 2019-12-07 | End: 2020-09-25

## 2019-12-07 RX ORDER — IBUPROFEN 600 MG/1
600 TABLET ORAL
Qty: 39 TABLET | Refills: 0 | Status: SHIPPED | OUTPATIENT
Start: 2019-12-07

## 2019-12-07 RX ORDER — HYDRALAZINE HYDROCHLORIDE 20 MG/ML
10 INJECTION INTRAMUSCULAR; INTRAVENOUS EVERY 4 HOURS PRN
Status: DISCONTINUED | OUTPATIENT
Start: 2019-12-07 | End: 2019-12-07

## 2019-12-07 RX ORDER — FOLIC ACID 1 MG/1
1 TABLET ORAL DAILY
Qty: 30 TABLET | Refills: 0 | Status: SHIPPED | OUTPATIENT
Start: 2019-12-08

## 2019-12-07 NOTE — PLAN OF CARE
Problem: Patient Centered Care  Goal: Patient preferences are identified and integrated in the patient's plan of care  Description  Interventions:  - What would you like us to know as we care for you? My son helps me at home.   - Provide timely, complete, retention  Outcome: Adequate for Discharge     Problem: MUSCULOSKELETAL - ADULT  Goal: Return mobility to safest level of function  Description  INTERVENTIONS:  - Assess patient stability and activity tolerance for standing, transferring and ambulating w/

## 2019-12-07 NOTE — PLAN OF CARE
Patient resting in bed comfortably. Rib pain relieved with PRN doses of norco. Patient alert and oriented x 4. Patient denied shortness of breath. 4L of oxygen, high flow nasal cannula.  One call from tele, patient's heart rate ranging from 130-150's when a RESPIRATORY - ADULT  Goal: Achieves optimal ventilation and oxygenation  Description  INTERVENTIONS:  - Assess for changes in respiratory status  - Assess for changes in mentation and behavior  - Position to facilitate oxygenation and minimize respiratory

## 2019-12-07 NOTE — PROGRESS NOTES
[facility]    Progress Note      Assessment and Plan:   1.  Rib fractures with modest pneumothorax and small right pleural effusion–the patient is well clinically. Underlying tobacco use.     Recommendations: Okay to discharge home, smoking

## 2019-12-07 NOTE — PROGRESS NOTES
Pt has order for discharge to home. PT dressed self and no longer has IV site. Discharge instructions reviewed with pt and scripts from Dr. Galo Choe given. Pts own med (Lisinopril) returned to him. Patient understanding of discharge instructions.  Son here

## 2019-12-08 NOTE — DISCHARGE SUMMARY
[place] [facility]    Discharge Summary    Eric Kennedy Patient Status:  Inpatient    1960 MRN [id number]   Location [facility] 5SW/SE Attending No att. providers found   [facility] Day # 5 PCP Jamni Chong MD     Date of Admission: still pending. X-ray of the ribs showed extensive displaced posterolateral right rib fractures extending from the third to the ninth ribs, subcutaneous emphysema on the right posterior chest and neck base.   There is pneumothorax that is small at the apex Tabs      Take 1 tablet (100 mg total) by mouth daily. Quantity:  30 tablet  Refills:  0        CONTINUE taking these medications      Instructions Prescription details   Lisinopril-hydroCHLOROthiazide 10-12.5 MG Tabs      Take 1 tablet by mouth daily.

## 2019-12-09 NOTE — PAYOR COMM NOTE
--------------  DISCHARGE REVIEW    Payor: Mago Patelbrandie #:  [id number]  Authorization Number: [id number]    Admit date: 12/2/19  Admit time:  6478  Discharge Date: 12/7/2019  3:40 PM     Admitting Physician: Mary Dempsey MD  Attending Physician:  Avery Bazan bilaterally  Cardiovascular: S1, S2 normal, no murmur, click, rub or gallop, regular rate and rhythm  Abdominal: soft, non-tender; bowel sounds normal; no masses,  no organomegaly  Extremities: extremities normal, atraumatic, no cyanosis or edema  Psychiat Prescription details   Albuterol Sulfate  (90 Base) MCG/ACT Aers      inhale 2 puff by inhalation route  every 4 hours as needed   Quantity:  1 Inhaler  Refills:  5     folic acid 1 MG Tabs  Commonly known as:  FOLVITE      Take 1 tablet (1 mg total

## 2019-12-18 NOTE — CDS QUERY
Potential for Impaired Nutritional Status  DOCUMENTATION CLARIFICATION FORM  Dear Doctor:  Clinical information suggests potential for impaired nutritional status.  For accurate ICD-10-CM code assignment to reflect severity of illness and risk of mortality, appears unbalanced and low in fruits, vegetables, fiber, nutrients, minerals and high in Na+ . Pt relates was surprised to hear how much he weighs. Does not weigh himself and had not been to MD offfice to know scaled weight.  States thought his weight was 1 Fat/Muscle Mass: moderate depletion body fat triceps region and fat overlying rib cage region and moderate depletion muscle mass temple region, clavicle region, scapular region, shoulder region, dorsal don region, thigh region and calf region per visual

## 2020-04-08 ENCOUNTER — APPOINTMENT (OUTPATIENT)
Dept: CT IMAGING | Facility: HOSPITAL | Age: 60
End: 2020-04-08
Attending: EMERGENCY MEDICINE
Payer: MEDICAID

## 2020-04-08 ENCOUNTER — HOSPITAL ENCOUNTER (EMERGENCY)
Facility: HOSPITAL | Age: 60
Discharge: HOME | End: 2020-04-08
Attending: EMERGENCY MEDICINE
Payer: MEDICAID

## 2020-04-08 VITALS
DIASTOLIC BLOOD PRESSURE: 104 MMHG | RESPIRATION RATE: 20 BRPM | TEMPERATURE: 97 F | OXYGEN SATURATION: 98 % | HEIGHT: 71 IN | HEART RATE: 78 BPM | WEIGHT: 150 LBS | SYSTOLIC BLOOD PRESSURE: 157 MMHG | BODY MASS INDEX: 21 KG/M2

## 2020-04-08 DIAGNOSIS — S40.012A CONTUSION OF LEFT SCAPULA, INITIAL ENCOUNTER: Primary | ICD-10-CM

## 2020-04-08 DIAGNOSIS — R91.1 PULMONARY NODULE: ICD-10-CM

## 2020-04-08 PROCEDURE — 85025 COMPLETE CBC W/AUTO DIFF WBC: CPT | Performed by: EMERGENCY MEDICINE

## 2020-04-08 PROCEDURE — 71250 CT THORAX DX C-: CPT | Performed by: EMERGENCY MEDICINE

## 2020-04-08 PROCEDURE — 36415 COLL VENOUS BLD VENIPUNCTURE: CPT

## 2020-04-08 PROCEDURE — 83735 ASSAY OF MAGNESIUM: CPT | Performed by: EMERGENCY MEDICINE

## 2020-04-08 PROCEDURE — 99284 EMERGENCY DEPT VISIT MOD MDM: CPT

## 2020-04-08 PROCEDURE — 80076 HEPATIC FUNCTION PANEL: CPT | Performed by: EMERGENCY MEDICINE

## 2020-04-08 PROCEDURE — 80048 BASIC METABOLIC PNL TOTAL CA: CPT | Performed by: EMERGENCY MEDICINE

## 2020-04-08 PROCEDURE — 70450 CT HEAD/BRAIN W/O DYE: CPT | Performed by: EMERGENCY MEDICINE

## 2020-04-08 NOTE — ED PROVIDER NOTES
Patient Seen in: [facility] AND [facility] Emergency Department      History   Patient presents with:  Shoulder Injury    Stated Complaint: left shoulder injury    HPI    79-year-old male seen a couple months ago with history of alcohol abuse and fall with multi Normocephalic and atraumatic. Eyes: Conjunctivae are normal. Pupils are equal, round, and reactive to light. Small artery healing left lateral superior periorbital laceration without signs of infection. Neck: Normal range of motion. Neck supple.   No mi -----------         ------                     CBC W/ DIFFERENTIAL[[id number]]          Abnormal            Final result                 Please view results for these tests on the individual orders.           Ct Chest (cpt=71306)    Result D thickening suggestive of chronic airway inflammation. 0.5 cm nodule is seen within the left upper lobe abutting the left major fissure (series 10, image  34). CHEST WALL/BONES: There is degenerative disease of the thoracic spine.   There are multiple jax prominent but symmetric consistent with mild to moderate generalized atrophy, unchanged. No hydrocephalus, subarachnoid hemorrhage, or mass. No midline shift. CEREBRUM: Excessive generalized atrophy is present for a patient of this age.   No edema, hemor unremarkable, slightly low bicarb from nml, pt appears slightly dehydrated. Will have increase fluid intake. No signs of EtOH withdrawal.  Patient adamantly wants to go home.   Instructed on close follow-up and return in wound care for the left periorbita

## 2020-04-08 NOTE — ED INITIAL ASSESSMENT (HPI)
Patient tripped and fell on his patient today striking the left side of his head and left shoulder. Denies LOC, and is not c/o head pain, but is having pain in his left scapula. Denies the use of blood thinners.

## 2020-04-08 NOTE — ED NOTES
Patient verbalizes understanding of discharge instructions and follow up. Patient ambulatory and stable upon discharge.

## 2021-04-12 ENCOUNTER — HOSPITAL ENCOUNTER (EMERGENCY)
Facility: HOSPITAL | Age: 61
Discharge: HOME | End: 2021-04-12
Attending: EMERGENCY MEDICINE
Payer: MEDICAID

## 2021-04-12 ENCOUNTER — APPOINTMENT (OUTPATIENT)
Dept: CT IMAGING | Facility: HOSPITAL | Age: 61
End: 2021-04-12
Attending: EMERGENCY MEDICINE
Payer: MEDICAID

## 2021-04-12 VITALS
RESPIRATION RATE: 18 BRPM | HEART RATE: 94 BPM | BODY MASS INDEX: 21 KG/M2 | OXYGEN SATURATION: 100 % | TEMPERATURE: 98 F | DIASTOLIC BLOOD PRESSURE: 100 MMHG | WEIGHT: 150 LBS | HEIGHT: 71 IN | SYSTOLIC BLOOD PRESSURE: 162 MMHG

## 2021-04-12 DIAGNOSIS — S30.0XXA CONTUSION OF LOWER BACK, INITIAL ENCOUNTER: Primary | ICD-10-CM

## 2021-04-12 PROCEDURE — 74176 CT ABD & PELVIS W/O CONTRAST: CPT | Performed by: EMERGENCY MEDICINE

## 2021-04-12 PROCEDURE — 99284 EMERGENCY DEPT VISIT MOD MDM: CPT

## 2021-04-12 RX ORDER — HYDROCODONE BITARTRATE AND ACETAMINOPHEN 5; 325 MG/1; MG/1
1 TABLET ORAL EVERY 6 HOURS PRN
Qty: 10 TABLET | Refills: 0 | Status: SHIPPED | OUTPATIENT
Start: 2021-04-12

## 2021-04-12 NOTE — ED INITIAL ASSESSMENT (HPI)
Pt reports falling two days ago after a couple of drinks, denies LOC, denies head trauma. Reports pain to right lower back and right hip. Ambulatory in triage.

## 2021-04-12 NOTE — ED PROVIDER NOTES
Patient Seen in: [facility] AND [facility] Emergency Department      History   Patient presents with:  Fall    Stated Complaint:     HPI/Subjective:   HPI    69-year-old male with onto a piece of furniture with complaints of severe right low back pain without ra Cardiovascular: Normal rate, regular rhythm and intact distal pulses. Pulmonary/Chest: Effort normal. No respiratory distress. Abdominal: Soft. There is no tenderness. There is no guarding.    Musculoskeletal: No visible signs of trauma to the low ba SPLEEN: Unremarkable PANCREAS: No gross ductal dilation. ADRENALS: Unremarkable KIDNEYS: No radiopaque renal calculus or hydronephrosis. Punctate nonspecific parenchymal calcification seen within the right interpolar kidney.  AORTA/VASCULAR:   There is ath 5-325 MG Oral Tab  Take 1 tablet by mouth every 6 (six) hours as needed.   Qty: 10 tablet Refills: 0

## 2023-09-12 ENCOUNTER — APPOINTMENT (OUTPATIENT)
Dept: CT IMAGING | Facility: HOSPITAL | Age: 63
End: 2023-09-12
Payer: MEDICAID

## 2023-09-12 ENCOUNTER — APPOINTMENT (OUTPATIENT)
Dept: GENERAL RADIOLOGY | Facility: HOSPITAL | Age: 63
End: 2023-09-12
Attending: EMERGENCY MEDICINE
Payer: MEDICAID

## 2023-09-12 ENCOUNTER — HOSPITAL ENCOUNTER (INPATIENT)
Facility: HOSPITAL | Age: 63
LOS: 4 days | Discharge: HOME HEALTH | End: 2023-09-16
Attending: EMERGENCY MEDICINE | Admitting: HOSPITALIST
Payer: MEDICAID

## 2023-09-12 ENCOUNTER — APPOINTMENT (OUTPATIENT)
Dept: CT IMAGING | Facility: HOSPITAL | Age: 63
End: 2023-09-12
Attending: EMERGENCY MEDICINE
Payer: MEDICAID

## 2023-09-12 DIAGNOSIS — I63.9 ACUTE CVA (CEREBROVASCULAR ACCIDENT) (HCC): Primary | ICD-10-CM

## 2023-09-12 DIAGNOSIS — S42.215A CLOSED NONDISPLACED FRACTURE OF SURGICAL NECK OF LEFT HUMERUS, UNSPECIFIED FRACTURE MORPHOLOGY, INITIAL ENCOUNTER: ICD-10-CM

## 2023-09-12 DIAGNOSIS — G31.9 CEREBRAL ATROPHY (HCC): ICD-10-CM

## 2023-09-12 LAB
ALBUMIN SERPL-MCNC: 3.5 G/DL (ref 3.4–5)
ALBUMIN/GLOB SERPL: 1.2 {RATIO} (ref 1–2)
ALP LIVER SERPL-CCNC: 71 U/L
ALT SERPL-CCNC: 17 U/L
ANION GAP SERPL CALC-SCNC: 7 MMOL/L (ref 0–18)
AST SERPL-CCNC: 12 U/L (ref 15–37)
ATRIAL RATE: 60 BPM
BASOPHILS # BLD AUTO: 0.06 X10(3) UL (ref 0–0.2)
BASOPHILS NFR BLD AUTO: 0.7 %
BILIRUB SERPL-MCNC: 0.3 MG/DL (ref 0.1–2)
BUN BLD-MCNC: 17 MG/DL (ref 7–18)
BUN/CREAT SERPL: 13.8 (ref 10–20)
CALCIUM BLD-MCNC: 8.3 MG/DL (ref 8.5–10.1)
CHLORIDE SERPL-SCNC: 109 MMOL/L (ref 98–112)
CHOLEST SERPL-MCNC: 186 MG/DL (ref ?–200)
CO2 SERPL-SCNC: 25 MMOL/L (ref 21–32)
CREAT BLD-MCNC: 1.23 MG/DL
DEPRECATED RDW RBC AUTO: 46.6 FL (ref 35.1–46.3)
EGFRCR SERPLBLD CKD-EPI 2021: 66 ML/MIN/1.73M2 (ref 60–?)
EOSINOPHIL # BLD AUTO: 0.24 X10(3) UL (ref 0–0.7)
EOSINOPHIL NFR BLD AUTO: 2.7 %
ERYTHROCYTE [DISTWIDTH] IN BLOOD BY AUTOMATED COUNT: 14.3 % (ref 11–15)
EST. AVERAGE GLUCOSE BLD GHB EST-MCNC: 120 MG/DL (ref 68–126)
GLOBULIN PLAS-MCNC: 2.9 G/DL (ref 2.8–4.4)
GLUCOSE BLD-MCNC: 116 MG/DL (ref 70–99)
GLUCOSE BLDC GLUCOMTR-MCNC: 113 MG/DL (ref 70–99)
GLUCOSE BLDC GLUCOMTR-MCNC: 159 MG/DL (ref 70–99)
HBA1C MFR BLD: 5.8 % (ref ?–5.7)
HCT VFR BLD AUTO: 37.9 %
HDLC SERPL-MCNC: 36 MG/DL (ref 40–59)
HGB BLD-MCNC: 12.7 G/DL
IMM GRANULOCYTES # BLD AUTO: 0.04 X10(3) UL (ref 0–1)
IMM GRANULOCYTES NFR BLD: 0.5 %
LDLC SERPL CALC-MCNC: 110 MG/DL (ref ?–100)
LYMPHOCYTES # BLD AUTO: 3.15 X10(3) UL (ref 1–4)
LYMPHOCYTES NFR BLD AUTO: 35.6 %
MCH RBC QN AUTO: 29.5 PG (ref 26–34)
MCHC RBC AUTO-ENTMCNC: 33.5 G/DL (ref 31–37)
MCV RBC AUTO: 87.9 FL
MONOCYTES # BLD AUTO: 0.71 X10(3) UL (ref 0.1–1)
MONOCYTES NFR BLD AUTO: 8 %
NEUTROPHILS # BLD AUTO: 4.64 X10 (3) UL (ref 1.5–7.7)
NEUTROPHILS # BLD AUTO: 4.64 X10(3) UL (ref 1.5–7.7)
NEUTROPHILS NFR BLD AUTO: 52.5 %
NONHDLC SERPL-MCNC: 150 MG/DL (ref ?–130)
OSMOLALITY SERPL CALC.SUM OF ELEC: 295 MOSM/KG (ref 275–295)
P AXIS: 73 DEGREES
P-R INTERVAL: 152 MS
PLATELET # BLD AUTO: 273 10(3)UL (ref 150–450)
POTASSIUM SERPL-SCNC: 3.9 MMOL/L (ref 3.5–5.1)
PROT SERPL-MCNC: 6.4 G/DL (ref 6.4–8.2)
Q-T INTERVAL: 432 MS
QRS DURATION: 88 MS
QTC CALCULATION (BEZET): 432 MS
R AXIS: 69 DEGREES
RBC # BLD AUTO: 4.31 X10(6)UL
SODIUM SERPL-SCNC: 141 MMOL/L (ref 136–145)
T AXIS: 72 DEGREES
TRIGL SERPL-MCNC: 230 MG/DL (ref 30–149)
VENTRICULAR RATE: 60 BPM
VLDLC SERPL CALC-MCNC: 40 MG/DL (ref 0–30)
WBC # BLD AUTO: 8.8 X10(3) UL (ref 4–11)

## 2023-09-12 PROCEDURE — 70450 CT HEAD/BRAIN W/O DYE: CPT

## 2023-09-12 PROCEDURE — 70450 CT HEAD/BRAIN W/O DYE: CPT | Performed by: EMERGENCY MEDICINE

## 2023-09-12 PROCEDURE — 70498 CT ANGIOGRAPHY NECK: CPT | Performed by: EMERGENCY MEDICINE

## 2023-09-12 PROCEDURE — 73030 X-RAY EXAM OF SHOULDER: CPT | Performed by: EMERGENCY MEDICINE

## 2023-09-12 PROCEDURE — 70496 CT ANGIOGRAPHY HEAD: CPT | Performed by: EMERGENCY MEDICINE

## 2023-09-12 PROCEDURE — 99223 1ST HOSP IP/OBS HIGH 75: CPT | Performed by: HOSPITALIST

## 2023-09-12 RX ORDER — ATORVASTATIN CALCIUM 80 MG/1
80 TABLET, FILM COATED ORAL NIGHTLY
Status: DISCONTINUED | OUTPATIENT
Start: 2023-09-12 | End: 2023-09-16

## 2023-09-12 RX ORDER — ACETAMINOPHEN 325 MG/1
650 TABLET ORAL EVERY 4 HOURS PRN
Status: DISCONTINUED | OUTPATIENT
Start: 2023-09-12 | End: 2023-09-16

## 2023-09-12 RX ORDER — PROCHLORPERAZINE EDISYLATE 5 MG/ML
5 INJECTION INTRAMUSCULAR; INTRAVENOUS EVERY 8 HOURS PRN
Status: DISCONTINUED | OUTPATIENT
Start: 2023-09-12 | End: 2023-09-16

## 2023-09-12 RX ORDER — ACETAMINOPHEN 500 MG
500 TABLET ORAL EVERY 4 HOURS PRN
Status: DISCONTINUED | OUTPATIENT
Start: 2023-09-12 | End: 2023-09-16

## 2023-09-12 RX ORDER — ACETAMINOPHEN 650 MG/1
650 SUPPOSITORY RECTAL EVERY 4 HOURS PRN
Status: DISCONTINUED | OUTPATIENT
Start: 2023-09-12 | End: 2023-09-16

## 2023-09-12 RX ORDER — ONDANSETRON 2 MG/ML
4 INJECTION INTRAMUSCULAR; INTRAVENOUS EVERY 6 HOURS PRN
Status: DISCONTINUED | OUTPATIENT
Start: 2023-09-12 | End: 2023-09-16

## 2023-09-12 RX ORDER — ASPIRIN 81 MG/1
81 TABLET ORAL ONCE
Status: COMPLETED | OUTPATIENT
Start: 2023-09-12 | End: 2023-09-12

## 2023-09-12 RX ORDER — HYDRALAZINE HYDROCHLORIDE 20 MG/ML
10 INJECTION INTRAMUSCULAR; INTRAVENOUS EVERY 2 HOUR PRN
Status: DISCONTINUED | OUTPATIENT
Start: 2023-09-12 | End: 2023-09-16

## 2023-09-12 RX ORDER — ASPIRIN 81 MG/1
81 TABLET, CHEWABLE ORAL DAILY
Status: DISCONTINUED | OUTPATIENT
Start: 2023-09-13 | End: 2023-09-16

## 2023-09-12 RX ORDER — LABETALOL HYDROCHLORIDE 5 MG/ML
10 INJECTION, SOLUTION INTRAVENOUS EVERY 10 MIN PRN
Status: DISCONTINUED | OUTPATIENT
Start: 2023-09-12 | End: 2023-09-16

## 2023-09-12 RX ORDER — NICOTINE 21 MG/24HR
1 PATCH, TRANSDERMAL 24 HOURS TRANSDERMAL DAILY
Status: DISCONTINUED | OUTPATIENT
Start: 2023-09-12 | End: 2023-09-16

## 2023-09-12 NOTE — ED QUICK NOTES
Orders for admission, patient is aware of plan and ready to go upstairs. Any questions, please call ED RN windy at extension [phone].      Patient Covid vaccination status: Unvaccinated     COVID Test Ordered in ED: None    COVID Suspicion at Admission: N/A    Running Infusions:  None    Mental Status/LOC at time of transport: axox4    Other pertinent information:   CIWA score: N/A   NIH score:  13

## 2023-09-12 NOTE — ED INITIAL ASSESSMENT (HPI)
Patient arrived via EMS. EMS reports patients presents with left sided weakness and left sided facial droop. Stroke alert called in the field. Patient AxOx4.

## 2023-09-13 ENCOUNTER — APPOINTMENT (OUTPATIENT)
Dept: CV DIAGNOSTICS | Facility: HOSPITAL | Age: 63
End: 2023-09-13
Attending: Other
Payer: MEDICAID

## 2023-09-13 PROBLEM — I63.9 ACUTE CVA (CEREBROVASCULAR ACCIDENT) (HCC): Status: RESOLVED | Noted: 2023-09-12 | Resolved: 2023-09-13

## 2023-09-13 PROBLEM — I65.22 LEFT CAROTID ARTERY OCCLUSION: Status: ACTIVE | Noted: 2023-09-13

## 2023-09-13 PROBLEM — I65.01 OCCLUSION OF RIGHT VERTEBRAL ARTERY: Status: ACTIVE | Noted: 2023-09-13

## 2023-09-13 PROBLEM — I65.02 STENOSIS OF LEFT VERTEBRAL ARTERY: Status: ACTIVE | Noted: 2023-09-13

## 2023-09-13 PROBLEM — I67.9 INTRACRANIAL VASCULAR STENOSIS: Status: ACTIVE | Noted: 2023-09-13

## 2023-09-13 PROBLEM — G45.9 TIA (TRANSIENT ISCHEMIC ATTACK): Status: ACTIVE | Noted: 2023-09-13

## 2023-09-13 LAB
ANION GAP SERPL CALC-SCNC: 7 MMOL/L (ref 0–18)
BASOPHILS # BLD AUTO: 0.02 X10(3) UL (ref 0–0.2)
BASOPHILS NFR BLD AUTO: 0.2 %
BUN BLD-MCNC: 16 MG/DL (ref 7–18)
BUN/CREAT SERPL: 15 (ref 10–20)
CALCIUM BLD-MCNC: 8.6 MG/DL (ref 8.5–10.1)
CHLORIDE SERPL-SCNC: 110 MMOL/L (ref 98–112)
CO2 SERPL-SCNC: 23 MMOL/L (ref 21–32)
CREAT BLD-MCNC: 1.07 MG/DL
DEPRECATED RDW RBC AUTO: 45.9 FL (ref 35.1–46.3)
EGFRCR SERPLBLD CKD-EPI 2021: 77 ML/MIN/1.73M2 (ref 60–?)
EOSINOPHIL # BLD AUTO: 0.1 X10(3) UL (ref 0–0.7)
EOSINOPHIL NFR BLD AUTO: 1 %
ERYTHROCYTE [DISTWIDTH] IN BLOOD BY AUTOMATED COUNT: 14.2 % (ref 11–15)
GLUCOSE BLD-MCNC: 101 MG/DL (ref 70–99)
GLUCOSE BLDC GLUCOMTR-MCNC: 123 MG/DL (ref 70–99)
GLUCOSE BLDC GLUCOMTR-MCNC: 126 MG/DL (ref 70–99)
GLUCOSE BLDC GLUCOMTR-MCNC: 150 MG/DL (ref 70–99)
GLUCOSE BLDC GLUCOMTR-MCNC: 184 MG/DL (ref 70–99)
HCT VFR BLD AUTO: 34.8 %
HGB BLD-MCNC: 11.5 G/DL
IMM GRANULOCYTES # BLD AUTO: 0.06 X10(3) UL (ref 0–1)
IMM GRANULOCYTES NFR BLD: 0.6 %
LYMPHOCYTES # BLD AUTO: 1.43 X10(3) UL (ref 1–4)
LYMPHOCYTES NFR BLD AUTO: 13.7 %
MCH RBC QN AUTO: 29 PG (ref 26–34)
MCHC RBC AUTO-ENTMCNC: 33 G/DL (ref 31–37)
MCV RBC AUTO: 87.7 FL
MONOCYTES # BLD AUTO: 0.9 X10(3) UL (ref 0.1–1)
MONOCYTES NFR BLD AUTO: 8.6 %
NEUTROPHILS # BLD AUTO: 7.9 X10 (3) UL (ref 1.5–7.7)
NEUTROPHILS # BLD AUTO: 7.9 X10(3) UL (ref 1.5–7.7)
NEUTROPHILS NFR BLD AUTO: 75.9 %
OSMOLALITY SERPL CALC.SUM OF ELEC: 291 MOSM/KG (ref 275–295)
PLATELET # BLD AUTO: 230 10(3)UL (ref 150–450)
POTASSIUM SERPL-SCNC: 4 MMOL/L (ref 3.5–5.1)
RBC # BLD AUTO: 3.97 X10(6)UL
SODIUM SERPL-SCNC: 140 MMOL/L (ref 136–145)
WBC # BLD AUTO: 10.4 X10(3) UL (ref 4–11)

## 2023-09-13 PROCEDURE — 99233 SBSQ HOSP IP/OBS HIGH 50: CPT | Performed by: HOSPITALIST

## 2023-09-13 PROCEDURE — 93306 TTE W/DOPPLER COMPLETE: CPT | Performed by: OTHER

## 2023-09-13 PROCEDURE — 99223 1ST HOSP IP/OBS HIGH 75: CPT | Performed by: OTHER

## 2023-09-13 RX ORDER — ENOXAPARIN SODIUM 100 MG/ML
40 INJECTION SUBCUTANEOUS DAILY
Status: DISCONTINUED | OUTPATIENT
Start: 2023-09-13 | End: 2023-09-16

## 2023-09-13 RX ORDER — CLOPIDOGREL BISULFATE 75 MG/1
75 TABLET ORAL DAILY
Qty: 90 TABLET | Refills: 0 | Status: DISCONTINUED | OUTPATIENT
Start: 2023-09-13 | End: 2023-09-16

## 2023-09-13 NOTE — CM/SW NOTE
Received MDO for [facility] Evaluation. Consulted w/ PT/OT and confirmed recommendation for [facility] services at DC. SW attempted to meet w/ pt for assessment and DC planning discussion. Pt is out of his room for a test at this time. Tentative/proactive HH referrals sent via [employer]. F2F entered/sent. SW to f/up later today for assessment and further DC planning as schedule permits. PLAN: Possible HH - pending list/choice & med clear      SW/CM to remain available for support and/or discharge planning.        Wale Blackburn, MSW, [facility]

## 2023-09-13 NOTE — PLAN OF CARE
Patient is alert & oriented x4 on RA. Vital signs stable at this time. Neuro q2. No acute changes noted throughout shift; patient slept. Nicotine patch right shoulder. Fall precautions maintained - bed alarm on, bed locked in lowest position, call light and personal belongings within reach, non-skid socks in place. Frequent rounding by nursing staff. Plan Echo, MRI, ortho consult. Problem: Patient Centered Care  Goal: Patient preferences are identified and integrated in the patient's plan of care  Description: Interventions:  - What would you like us to know as we care for you?  From home with son   - Provide timely, complete, and accurate information to patient/family  - Incorporate patient and family knowledge, values, beliefs, and cultural backgrounds into the planning and delivery of care  - Encourage patient/family to participate in care and decision-making at the level they choose  - Honor patient and family perspectives and choices  Outcome: Progressing     Problem: Patient/Family Goals  Goal: Patient/Family Long Term Goal  Description: Patient's Long Term Goal: To go home    Interventions:  - Follow MD orders  - See additional Care Plan goals for specific interventions  Outcome: Progressing  Goal: Patient/Family Short Term Goal  Description: Patient's Short Term Goal: Manage left sided weakness    Interventions:   - Follow MD orders  - See additional Care Plan goals for specific interventions  Outcome: Progressing

## 2023-09-13 NOTE — PHYSICAL THERAPY NOTE
PHYSICAL THERAPY EVALUATION - INPATIENT     Room Number: 509/707-Q  Evaluation Date: 9/13/2023  Type of Evaluation: Initial   Physician Order: PT Eval and Treat    Presenting Problem: left sided weakness and facial droop, fall on/near stairs with left humeral fx that will be managed non-operatively per Dr Panfilo James, sling at all times  Co-Morbidities : L ICA complete occlusion, alcoholism, fall 2019 w/rib fxs  Reason for Therapy: Mobility Dysfunction and Discharge Planning    PHYSICAL THERAPY ASSESSMENT     Patient is a 58year old male admitted 9/12/2023 for fall and left sided weakness and new left humeral fx being managed conservatively. Patient's current functional deficits include uncontrolled BP with mobility, cognition/problem solving, LUE fx and limited mobility, motor planning and coordination, LLE strength, transfers, gait and stair navigation, which are below the patient's pre-admission status. Patient does not currently have the skills to return home alone lives with son who works full time. The patient's Approx Degree of Impairment: 50.57% has been calculated based on documentation in the [facility] '6 clicks' Inpatient Basic Mobility Short Form. Research supports that patients with this level of impairment may benefit from inpatient rehab vs home with 24 hr care. Current recommendation is for rehab pending progress in hospital but will continue to assess     RN approves participation in therapy session. Patient presents lying sideways in bed with sling off and left arm out to side and legs off bed. He reports he took the sling off because it was bothering him. He attempts to get up from bed using both arms to push up and is very uncoordinated and  unable to problem solve how to sit up without using LUE. Education about his new humeral fracture and that is is currently in good position and should heal per surgeon notes but that he should NOT use the arm and is NWB for now.  Long discussion and re-applied sling for duration of session. Patient will need reinforcement of this information. He needs min A for bed mobility and is able to sit at edge of bed but has intermittent LOB posteriorly where both legs lift up and he leans back, needs assist and cues to right himself. When asked to move leg or arm he moves both legs at the same time and has difficulty isolating and moving one leg at a time. He has impaired coordination and LYNDON of the LLE and pt does report he can tell the left leg is not moving as well. Repeated sit to/from stand x 5 reps shows fatigue and LOB posteriorly with increased reps. Cues to keep toes down and bring body over feet. He ambulates without a device at a fast self selected pace but has noted ataxia and near scissoring with steppage gait on LLE, needs mod A several times for LOB and pt does not slow or self correct to prevent a fall even though he reports he can tell he is off balance. After education he does reach for a wall for support when he is off balance and is able to slow down with cues as well. Ambulated 75'x3 with rest breaks between. Performs stairs x 4 with single HR and mod A due to rushing and LOB. Attempted tandem stand, SLS and he is unable without firm UE support on the LLE. He is currently needing CGA-mod A at times for all mobility for safety and due to high fall risk. Initial /103 but after activity BP up to 192/111 and  with patient unaware and asymptomatic throughout, feeling fine per his report. He is placed in bed and assisted to comfort and RN notified of vitals. Alarm set and pt is currently below his normal functional baseline and does not have the skills to return home alone. Will continue to assess mobility and dc plan. Patient will benefit from continued IP PT services to address these deficits in preparation for discharge.     DISCHARGE RECOMMENDATIONS  PT Discharge Recommendations: Undetermined (Pending progress and mobility; Acute rehab vs home w/24 hr and Day Rehab)    PLAN  PT Treatment Plan: Bed mobility; Don/doff brace; Endurance; Energy conservation;Patient education; Family education;Gait training;Neuromuscular re-educate;Stair training;Transfer training;Balance training  Rehab Potential : Good  Frequency (Obs): 5x/week       PHYSICAL THERAPY MEDICAL/SOCIAL HISTORY     History related to current admission: Fall at home on/near stairs? With humeral fracture and new left sided weakness and impairment     Problem List  Principal Problem:    TIA (transient ischemic attack)  Active Problems:    Left carotid artery occlusion    Intracranial vascular stenosis    Occlusion of right vertebral artery    Stenosis of left vertebral artery      HOME SITUATION  Home Situation  Type of Home: House (pt lives in the basement of his sons home)  Home Layout: One level  Stairs to Enter : 6 (steps down to the basement, railings on both sides)  Railing: Yes  Stairs to Bedroom: 0  Lives With: Son (son works full time and is gone most of the day)  Drives: No (pt does not drive)  Patient Owned Equipment: None  Patient Regularly Uses: Reading glasses     Prior Level of [place]: Patient lives in the basement of a home with his son who works full time and is gone often. Patient does not drive but gets around and cooks and manages his home. SUBJECTIVE  \"I need to wear that all the time? \"    PHYSICAL THERAPY EXAMINATION     OBJECTIVE  Precautions: Limb alert - left;Bed/chair alarm; Other (Comment) (sling at all times)  Fall Risk: High fall risk    WEIGHT BEARING RESTRICTION     L Upper Extremity: Non-Weight Bearing          PAIN ASSESSMENT  Ratin  Location: left arm, pt has a sling but it was removed. Management Techniques: Body mechanics;Breathing techniques;Relaxation;Repositioning; Other (Comment) (Reinforced use of sling AT ALL TIMES, NWROHITH GIORDANO, that healing of bone takes 6 weeks and that he may need surgery if continues to use it and displacement becomes worse)    COGNITION  Overall Cognitive Status:  Impaired and A&Ox3 but poor problem solving and safety throughout, difficulty coming up with solutions to risks    RANGE OF MOTION AND STRENGTH ASSESSMENT  Upper extremity ROM and strength are within functional limits RUE wfl, LUE new humeral fx moving hand and wrist    Lower extremity ROM is within functional limits   Lower extremity strength is within functional limits but impaired motor planning and coordination    BALANCE  Static Sitting: Fair  Dynamic Sitting: Fair  Static Standing: Fair  Dynamic Standing: Poor +    ADDITIONAL TESTS      SLS/Tandem stand unable without significant UE support              NEUROLOGICAL FINDINGS     Coordination - Heel to Timmons: Left decreased speed;Left decreased accuracy  Coordination - Rapid Alternating Movement: Left decreased speed;Left decreased accuracy             ACTIVITY TOLERANCE  Pulse: (!) 136  Heart Rate Source: Monitor     BP: (!) 192/111 (after walking, stairs; initial /103. pt is asmymptomatic, RN aware and pt back in bed)  BP Location: Right arm  BP Method: Automatic  Patient Position: Standing    O2 WALK  Oxygen Therapy  SPO2% on Room Air at Rest: 96  SPO2% Ambulation on Room Air: 98    AM-PAC '6-Clicks' INPATIENT SHORT FORM - BASIC MOBILITY  How much difficulty does the patient currently have. .. Patient Difficulty: Turning over in bed (including adjusting bedclothes, sheets and blankets)?: A Little   Patient Difficulty: Sitting down on and standing up from a chair with arms (e.g., wheelchair, bedside commode, etc.): A Little   Patient Difficulty: Moving from lying on back to sitting on the side of the bed?: A Little   How much help from another person does the patient currently need. ..    Help from Another: Moving to and from a bed to a chair (including a wheelchair)?: A Little   Help from Another: Need to walk in hospital room?: A Little   Help from Another: Climbing 3-5 steps with a railing?: A Lot AM-PAC Score:  Raw Score: 17   Approx Degree of Impairment: 50.57%   Standardized Score (AM-PAC Scale): 42.13   CMS Modifier (G-Code): CK    FUNCTIONAL ABILITY STATUS  Functional Mobility/Gait Assessment  Gait Assistance: Minimum assistance  Distance (ft): 75'x3  Assistive Device: None  Pattern: Ataxic;Scissoring (LOB to right and left, decreased heel strike and push off. Pt reports baseline issues with balance but much worse today)  Stairs: Stairs  How Many Stairs: 4  Device: 1 Rail  Assist: Moderate assist  Pattern: Ascend and Descend  Ascend and Descend : Reciprocal    Bed Mobility: min A for log rolling to right and maintaining NWB LUE, unable to problem solve how to roll and get in/out of bed and pt repeatedly flailing on his back to try to sit up to EOB and unable to perform without help or using his LUE    Transfers: supervision to min A, LOB on turns and when navigating obstacles in room    Exercise/Education Provided:  Bed mobility  Body mechanics  Functional activity tolerated  Gait training  Posture  Transfer training  Stair training, sling management    Patient End of Session: In bed;Needs met;Call light within reach;RN aware of session/findings; All patient questions and concerns addressed;Bracing education provided per handout; Alarm set; Discussed recommendations with /    CURRENT GOALS    Goals to be met by: 10/1/23  Patient Goal Patient's self-stated goal is: to go home today   Goal #1 Patient is able to demonstrate supine - sit EOB @ level: modified independent     Goal #1   Current Status    Goal #2 Patient is able to demonstrate transfers Sit to/from Stand at assistance level: modified independent with cane - straight     Goal #2  Current Status    Goal #3 Patient is able to ambulate 150 feet with assist device: cane - straight at assistance level: minimum assistance   Goal #3   Current Status    Goal #4 Patient will negotiate 6 stairs/one curb w/ assistive device and minimal assistance   Goal #4   Current Status    Goal #5 Patient to demonstrate independence with home activity/exercise instructions provided to patient in preparation for discharge.    Goal #5   Current Status    Goal #6    Goal #6  Current Status      Patient Evaluation Complexity Level:  History Moderate - 1 or 2 personal factors and/or co-morbidities   Examination of body systems Moderate - addressing a total of 3 or more elements   Clinical Presentation Moderate - Evolving   Clinical Decision Making Moderate Complexity       Gait Training: 15 minutes  Therapeutic Activity: 16 minutes  Neuromuscular Re-education: 14 minutes

## 2023-09-13 NOTE — H&P
[facility]    PATIENT'S NAME: Berl Pallas   ATTENDING PHYSICIAN: Jc Almendarez MD   PATIENT ACCOUNT#:   [id number]    LOCATION:  [address] [place] [address] RECORD #:   [id number]       YOB: 1960  ADMISSION DATE:       09/12/2023    HISTORY AND PHYSICAL EXAMINATION    (Addendum added 09/13/2023)    CHIEF COMPLAINT:  Transient ischemic attack. HISTORY OF PRESENT ILLNESS:  Patient is a 59-year-old  male who was climbing the stairs earlier today and he fell down. His found him lying on his left side and with slurred speech and left facial droop. Brought him into the emergency department for evaluation. Within 25 to 30 minutes, his symptoms resolved. He is complaining of left shoulder pain. CBC and chemistry are unremarkable. EKG showed normal sinus rhythm. CT scan of the brain showed no acute findings. CT angiogram of the brain showed complete occlusion of the left internal carotid artery just beyond its origin; patent anterior communicating artery and left A2 anterior cerebral artery constitutes a markedly narrowed left A1 and MCA; occluded V1 segment of right vertebral artery with reconstitution of smaller caliber V2; severe narrowing of left vertebral artery in origin, otherwise patent left vertebral artery. Attenuated distal right posterior cerebral artery suggests mild to moderate diffuse distal disease. Patient was given aspirin in the emergency room, and he will be admitted to the hospital for further management. PAST MEDICAL HISTORY:  Patient has a history of alcoholism and hypertension. Denies any other medical problems. In 2019, he was hospitalized with a fall and multiple rib fractures. PAST SURGICAL HISTORY:  None. MEDICATIONS:  Please see medication reconciliation list.  He takes lisinopril but does not take aspirin on a daily basis. ALLERGIES:  No known drug allergies. FAMILY HISTORY:  Positive for hypertension.     SOCIAL HISTORY:  Chronic tobacco use. Smokes 1 pack a day. Ex-alcohol user. No drug use. Lives with his son. Usually independent in his basic activities of daily living. REVIEW OF SYSTEMS:  Per the son, patient has been having multiple episodes of gait imbalance and falls in the last several weeks. He has not been evaluated. Today, he fell down while climbing the stairs, and he was noted to be aphasic per his son with left facial droop. Now symptoms have resolved. Only he had left shoulder pain after he fell. Otherwise unremarkable. PHYSICAL EXAMINATION:    GENERAL:  Alert and oriented to time, place and person. No acute distress. VITAL SIGNS:  Temperature 98.0, pulse 120/80, pulse ox 92% on room air, respiratory rate 17, pulse 69. HEENT:  Atraumatic. Oropharynx clear. Dry mucous membranes. NECK:  Supple. No lymphadenopathy. Trachea midline. Full range of motion. LUNGS:  Clear to auscultation bilaterally. Normal respiratory effort. HEART:  Regular rate and rhythm. S1 and S2 auscultated. No murmur. ABDOMEN:  Soft, nondistended. No tenderness. Positive bowel sounds. EXTREMITIES:  No peripheral edema, clubbing or cyanosis. NEUROLOGIC:  No clear focal defects. Patient cannot lift his left arm because of pain in his shoulder. ASSESSMENT:    1. Possible transient ischemic attack. 2.   Complete occlusion of the left carotid artery and significant stenosis of the right vertebral artery. 3.   Hypertension. 4.   Left shoulder pain, rule out humeral fracture after mechanical fall. PLAN:  Patient will be admitted to general medical floor. Daily aspirin. Obtain fasting lipid profile. MRI scan of the brain. Neurology consult. Left shoulder x-ray. Fall precautions. Physical and occupational therapy. Further recommendations to follow.     ADDENDUM (Job [id number]):    Left shoulder x-ray showed acute minimally displaced left proximal humeral fractures extending through the greater tuberosity and surgical neck. Orthopedic surgery consult was notified.        Dictated By Kalli Rios MD  d: 09/12/2023 18:23:22  t: 09/12/2023 18:55:06  [facility] [id number]  [place]/

## 2023-09-13 NOTE — PHYSICAL THERAPY NOTE
PT order received, chart reviewed. Patient is gone for cardiac testing. Noted left humeral fx and non-operative treatment per ortho but in a sling. Will check back at a later time.

## 2023-09-13 NOTE — PLAN OF CARE
Pt A/Ox4. Room air. Speech eval- no straw. PT/OT recommending HH. Echo performed. Plan for MRI. Q4 neuro's. Tylenol given for L shoulder pain. Sling applied to L shoulder. Ortho no interventions at this time. No new interventions for left carotid at this time. Permissive hypertension allowed. Call light within reach and safety precautions in place.      Problem: Patient Centered Care  Goal: Patient preferences are identified and integrated in the patient's plan of care  Description: Interventions:  - What would you like us to know as we care for you?   - Provide timely, complete, and accurate information to patient/family  - Incorporate patient and family knowledge, values, beliefs, and cultural backgrounds into the planning and delivery of care  - Encourage patient/family to participate in care and decision-making at the level they choose  - Honor patient and family perspectives and choices  Outcome: Progressing     Problem: Patient/Family Goals  Goal: Patient/Family Long Term Goal  Description: Patient's Long Term Goal:     Interventions:  -   - See additional Care Plan goals for specific interventions  Outcome: Progressing  Goal: Patient/Family Short Term Goal  Description: Patient's Short Term Goal:     Interventions:   -   - See additional Care Plan goals for specific interventions  Outcome: Progressing

## 2023-09-13 NOTE — CM/SW NOTE
09/13/23 1400   CM/SW Referral Data   Referral Source Physician   Reason for Referral Protocol order set   Specify order set Stroke   Informant Patient   Medical Hx   Does patient have an established PCP? Yes  Jaziel Tyler   Patient Info   Patient's Current Mental Status at Time of Assessment Alert;Oriented   Patient's Home Environment Condo/Apt no elevator   Number of Levels in Home 1   Number of Stair in Home 6   Patient lives with Son   Patient Status Prior to Admission   Independent with ADLs and Mobility Yes   Discharge Needs   Anticipated D/C needs Home health care   Choice of Post-Acute Provider   Informed patient of right to choose their preferred provider Yes   List of appropriate post-acute services provided to patient/family with quality data Yes   Patient/family choice pending   Information given to Patient   [facility]ospice financial disclosure given Yes       Received MDO for [facility] Evaluations per stroke w/up protocol. Consulted w/ PT/OT who confirmed recommendation for [facility]. [facility] referrals sent this AM. F2F entered/sent. Met w/ pt at bedside. Above assessment completed. Pt lives w/ his son in an apt w/ 6 steps to enter. Pt does not use a cane or walker for ambulation. No hx w/ HH. Pt is agreeable to [facility] upon DC. MALKA provided [facility] list of quality data and explained f/up instructions. MALKA informed pt that due to his insurance, he might have limited # of sessions at home. He expressed understanding. PLAN: Home w/ HH - pending choice & med clear      SW/CM to remain available for support and/or discharge planning.          Sherri Alanis, MSW, [facility]

## 2023-09-13 NOTE — SLP NOTE
ADULT SWALLOWING EVALUATION    ASSESSMENT    ASSESSMENT/OVERALL IMPRESSION:  PPE REQUIRED. THIS SLP WORE GLOVES AND DROPLET MASK. HANDS SANITIZED/WASHED UPON ENTRANCE/EXIT. SLP BSSE orders received and acknowledged. A swallow evaluation warranted per stroke protocol and new onset of right sided facial weakness. The pt was admitted with diagnosis of acute CVA. Chart reviewed and collaborated with RN. Swallowing evaluation approved and RN reports pt is tolerating current diet without difficulty. Per MD note, \"Patient presents to the ED complaining of onset of left-sided weakness around 4 PM today. He states that he was walking up some steps when he fell and then noticed that he was unable to get up due to weakness in his leg, left arm and face. He denies other complaints. No headache at this time but does complain of some pain in his left shoulder. Landed on the shoulder in the fall. \"  Pt seen at bedside and is agreeable to participate in BSSE. Pt is alert and Ox4. Pt afebrile with clear vocal quality, tolerating room air with oxygen saturation at 96% prior to the start of po trials. Pt with no hx of dysphagia at [facility]. The pt was able to participate in conversational speech with 100% speech intelligibility and no word finding errors to short verbal responses. Pt positioned upright to 90 degrees in bed. Oral [facility] examination completed. Face asymmetrical at rest with minimal right sided facial weakness. Minimal reduction in right sided ROM/strength with labial, lingual, and buccal movements. Assessed the pt's swallowing with po trials of puree, hard solids, and thin liquids via open cup/straw. Pt with functional oral acceptance and bilabial seal across all trials. Oral phase of swallow appears timely with adequate bolus control and propulsion. Pt with intact bite, slightly prolonged mastication of solids from missing dentition, and timely A-P transit with solid bolus.   Minimal oral stasis was cleared with a liquid wash. Pharyngeal phase of the swallow appears slightly delayed with adequate hyolaryngeal elevation/excursion. No CXR completed at this time. Oxygen status remained >95% throughout the entire evaluation. Overt clinical signs of aspiration with coughing present on thin liquids via straw. No overt clinical signs of aspiration observed with any consistency puree, hard solids, or thin liquids via open cup(no coughing, no throat clearing, and vocal quality remains dry). Pt denies any globus sensation post the swallow. The pt was left resting in bed following the BSSE, watching television with his call light in reach. Oral cavity was clear of all bolus or residue. At this time, pt presents with minmal oral dysphagia and probable pharyngeal dysfunction. Recommend a regular solid diet and thin liquids/no straw with strict adherence to safe swallowing compensatory strategies. Results and recommendations reviewed with RN. SLP collaborated with RN for diet orders. Diet recommendations and swallowing precautions/strategies posted on white board at bedside. FCM SCORE: 6/7       PLAN:  Will follow up x2 to ensure safety with diet and educate pt on compensatory strategies/swallow precautions. Video swallow study to be completed if CXR declines, increase in clinical signs of aspiration, and/or MD desires. SLE to be completed as needed pending MRI imaging results. RECOMMENDATIONS   Diet Recommendations - Solids: Regular  Diet Recommendations - Liquids: Thin Liquids (No Straw)                        Compensatory Strategies Recommended: No straws; Slow rate; Alternate consistencies;Small bites and sips  Aspiration Precautions: Upright position; Slow rate;Small bites and sips; No straw  Medication Administration Recommendations: One pill at a time  Treatment Plan/Recommendations: Dysphagia therapy; Aspiration precautions (SLE pending MRI imaging)  Discharge Recommendations/Plan: Undetermined    HISTORY MEDICAL HISTORY  Reason for Referral: Stroke protocol    Problem List  Principal Problem:    Acute CVA (cerebrovascular accident) [facility])      Past Medical History  Past Medical History:   Diagnosis Date    ETOH abuse     High blood pressure     Tobacco abuse        Prior Living Situation: Home with support  Diet Prior to Admission: Regular; Thin liquids  Precautions: Aspiration    Patient/Family Goals: To eat    SWALLOWING HISTORY  Current Diet Consistency: Regular; Thin liquids  Dysphagia History: No history of dysphagia  Imaging Results:   CT Brain 9/12/23:  CONCLUSION:         1. No acute intracranial hemorrhage. 2. No transcortical area of hypoattenuation to suggest an acute infarct. If there is clinical concern for an acute infarct, an MRI brain is more sensitive for detection. 3. Redemonstrated small chronic infarcts in the right caudate nucleus and right external capsule   4. Mild to moderate parenchymal volume loss, predominantly affecting the left cerebellar hemisphere. 5. Mild-to-moderate chronic microvascular ischemic changes. Impression points 1, 2 and 3 were communicated via telephone to [facility] MD  at 4:56 p.m. on 09/12/2023 by Susanna De MD      Dictated by (CST): Julian Montes MD on 9/12/2023 at 4:45 PM       Finalized by (CST): Julian Montes MD on 9/12/2023 at 5:05 PM       OBJECTIVE   ORAL MOTOR EXAMINATION  Dentition: Natural (scattered dentition)  Symmetry:  (Minimal right sided facial weakness)  Strength: Reduced right facial;Reduced right lingual (Minimal)  Tone: Reduced right facial;Reduced right lingual (Minimal)  Range of Motion: Reduced right facial;Reduced right lingual (Minimal)  Rate of Motion: Reduced    Voice Quality: Clear  Respiratory Status: Unlabored  Consistencies Trialed: Thin liquids;Puree;Hard solid  Method of Presentation: Self presentation;Spoon;Cup;Straw;Single sips; Consecutive swallows  Patient Positioning: Upright;Midline    Oral Phase of Swallow: Impaired  Bolus Retrieval: Intact  Bilabial Seal: Intact  Bolus Formation: Intact  Bolus Propulsion: Intact  Mastication: Impaired  Retention: Impaired    Pharyngeal Phase of Swallow: Impaired  Laryngeal Elevation Timing: Appears impaired  Laryngeal Elevation Strength: Appears intact  Laryngeal Elevation Coordination: Appears intact  (Please note: Silent aspiration cannot be evaluated clinically. Videofluoroscopic Swallow Study is required to rule-out silent aspiration.)    Esophageal Phase of Swallow: No complaints consistent with possible esophageal involvement                GOALS  Goal #1 The patient will tolerate regular solid consistency and thin liquids without overt signs or symptoms of aspiration with 100 % accuracy over 2 session(s). In Progress   Goal #2 The patient will utilize compensatory strategies as outlined by  BSSE (clinical evaluation) including Slow rate, Small bites, Small sips, Alternate liquids/solids, No straws, Upright 90 degrees with mild assistance 95 % of the time across 2 sessions. In Progress   Goal #3 Speech language evaluation as needed pending MRI imaging results. In Progress     FOLLOW UP  Treatment Plan/Recommendations: Dysphagia therapy; Aspiration precautions (SLE pending MRI imaging)  Number of Visits to Meet Established Goals: 2  Follow Up Needed (Documentation Required): Yes  SLP Follow-up Date: 09/14/23    Thank you for your referral.   If you have any questions, please contact     Marti Mares MS/[facility]-SLP  Speech Language Pathologist  [address] [facility]  [address].  [phone]

## 2023-09-13 NOTE — PROGRESS NOTES
ORTHO    Radiographs show a minimally displaced Proximal humerus fracture that will be amenable to nonoperative treatment. Use a sling for now. Full consult to follow tomorrow after a complete examination.     Oneil Gacría MD

## 2023-09-14 ENCOUNTER — APPOINTMENT (OUTPATIENT)
Dept: MRI IMAGING | Facility: HOSPITAL | Age: 63
End: 2023-09-14
Attending: Other
Payer: MEDICAID

## 2023-09-14 LAB
ALBUMIN SERPL-MCNC: 3.6 G/DL (ref 3.4–5)
ANION GAP SERPL CALC-SCNC: 8 MMOL/L (ref 0–18)
B2 GLYCOPROT1 IGG SERPL IA-ACNC: <0.8 U/ML (ref ?–7)
B2 GLYCOPROT1 IGM SERPL IA-ACNC: <2.4 U/ML (ref ?–7)
BASOPHILS # BLD AUTO: 0.05 X10(3) UL (ref 0–0.2)
BASOPHILS NFR BLD AUTO: 0.5 %
BUN BLD-MCNC: 14 MG/DL (ref 7–18)
BUN/CREAT SERPL: 12.3 (ref 10–20)
CALCIUM BLD-MCNC: 8.7 MG/DL (ref 8.5–10.1)
CARDIOLIPIN IGG SERPL-ACNC: <0.5 GPL (ref ?–10)
CARDIOLIPIN IGM SERPL-ACNC: 1.1 MPL (ref ?–10)
CHLORIDE SERPL-SCNC: 108 MMOL/L (ref 98–112)
CO2 SERPL-SCNC: 22 MMOL/L (ref 21–32)
CREAT BLD-MCNC: 1.14 MG/DL
DEPRECATED RDW RBC AUTO: 43.2 FL (ref 35.1–46.3)
EGFRCR SERPLBLD CKD-EPI 2021: 72 ML/MIN/1.73M2 (ref 60–?)
EOSINOPHIL # BLD AUTO: 0.07 X10(3) UL (ref 0–0.7)
EOSINOPHIL NFR BLD AUTO: 0.7 %
ERYTHROCYTE [DISTWIDTH] IN BLOOD BY AUTOMATED COUNT: 13.8 % (ref 11–15)
GLUCOSE BLD-MCNC: 110 MG/DL (ref 70–99)
GLUCOSE BLDC GLUCOMTR-MCNC: 170 MG/DL (ref 70–99)
HCT VFR BLD AUTO: 35.1 %
HGB BLD-MCNC: 11.9 G/DL
IMM GRANULOCYTES # BLD AUTO: 0.05 X10(3) UL (ref 0–1)
IMM GRANULOCYTES NFR BLD: 0.5 %
LYMPHOCYTES # BLD AUTO: 1.15 X10(3) UL (ref 1–4)
LYMPHOCYTES NFR BLD AUTO: 11.2 %
MAGNESIUM SERPL-MCNC: 2.2 MG/DL (ref 1.6–2.6)
MCH RBC QN AUTO: 29 PG (ref 26–34)
MCHC RBC AUTO-ENTMCNC: 33.9 G/DL (ref 31–37)
MCV RBC AUTO: 85.6 FL
MONOCYTES # BLD AUTO: 1.07 X10(3) UL (ref 0.1–1)
MONOCYTES NFR BLD AUTO: 10.4 %
NEUTROPHILS # BLD AUTO: 7.89 X10 (3) UL (ref 1.5–7.7)
NEUTROPHILS # BLD AUTO: 7.89 X10(3) UL (ref 1.5–7.7)
NEUTROPHILS NFR BLD AUTO: 76.7 %
OSMOLALITY SERPL CALC.SUM OF ELEC: 287 MOSM/KG (ref 275–295)
PHOSPHATE SERPL-MCNC: 2.6 MG/DL (ref 2.5–4.9)
PLATELET # BLD AUTO: 241 10(3)UL (ref 150–450)
POTASSIUM SERPL-SCNC: 3.8 MMOL/L (ref 3.5–5.1)
RBC # BLD AUTO: 4.1 X10(6)UL
SODIUM SERPL-SCNC: 138 MMOL/L (ref 136–145)
WBC # BLD AUTO: 10.3 X10(3) UL (ref 4–11)

## 2023-09-14 PROCEDURE — 99232 SBSQ HOSP IP/OBS MODERATE 35: CPT | Performed by: OTHER

## 2023-09-14 PROCEDURE — 70551 MRI BRAIN STEM W/O DYE: CPT | Performed by: OTHER

## 2023-09-14 PROCEDURE — 99233 SBSQ HOSP IP/OBS HIGH 50: CPT | Performed by: HOSPITALIST

## 2023-09-14 RX ORDER — POTASSIUM CHLORIDE 20 MEQ/1
40 TABLET, EXTENDED RELEASE ORAL ONCE
Status: COMPLETED | OUTPATIENT
Start: 2023-09-14 | End: 2023-09-14

## 2023-09-14 RX ORDER — LISINOPRIL 10 MG/1
10 TABLET ORAL DAILY
Status: DISCONTINUED | OUTPATIENT
Start: 2023-09-14 | End: 2023-09-16

## 2023-09-14 NOTE — PLAN OF CARE
Patient A/Ox4 on room air with complaints of pain to the left shoulder overnight- PRN given. Q 4 neuros completed with no acute changes. HR elevated with ambulation or intense movements. Asymptomatic and [facility] RN notified of events overnight. Call light in reach and no needs noted at this time. Problem: Patient Centered Care  Goal: Patient preferences are identified and integrated in the patient's plan of care  Description: Interventions:  - What would you like us to know as we care for you?   - Provide timely, complete, and accurate information to patient/family  - Incorporate patient and family knowledge, values, beliefs, and cultural backgrounds into the planning and delivery of care  - Encourage patient/family to participate in care and decision-making at the level they choose  - Honor patient and family perspectives and choices  Outcome: Progressing     Problem: Patient/Family Goals  Goal: Patient/Family Long Term Goal  Description: Patient's Long Term Goal:    Interventions  - See additional Care Plan goals for specific interventions  Outcome: Progressing  Goal: Patient/Family Short Term Goal  Description: Patient's Short Term Goal:     Interventions:   - See additional Care Plan goals for specific interventions  Outcome: Progressing     Problem: NEUROLOGICAL - ADULT  Goal: Achieves stable or improved neurological status  Description: INTERVENTIONS  - Assess for and report changes in neurological status  - Initiate measures to prevent increased intracranial pressure  - Maintain blood pressure and fluid volume within ordered parameters to optimize cerebral perfusion and minimize risk of hemorrhage  - Monitor temperature, glucose, and sodium.  Initiate appropriate interventions as ordered  Outcome: Progressing  Goal: Achieves maximal functionality and self care  Description: INTERVENTIONS  - Monitor swallowing and airway patency with patient fatigue and changes in neurological status  - Encourage and assist patient to increase activity and self care with guidance from PT/OT  - Encourage visually impaired, hearing impaired and aphasic patients to use assistive/communication devices  Outcome: Progressing

## 2023-09-14 NOTE — PLAN OF CARE
Pt A/Ox4. Q4 neuros and nih daily performed. MRI performed. BP meds added back on. Son updated. Call light within reach and safety precautions in place. Problem: Patient Centered Care  Goal: Patient preferences are identified and integrated in the patient's plan of care  Description: Interventions:  - What would you like us to know as we care for you?   - Provide timely, complete, and accurate information to patient/family  - Incorporate patient and family knowledge, values, beliefs, and cultural backgrounds into the planning and delivery of care  - Encourage patient/family to participate in care and decision-making at the level they choose  - Honor patient and family perspectives and choices  Outcome: Progressing     Problem: Patient/Family Goals  Goal: Patient/Family Long Term Goal  Description: Patient's Long Term Goal:     Interventions:  -   - See additional Care Plan goals for specific interventions  Outcome: Progressing  Goal: Patient/Family Short Term Goal  Description: Patient's Short Term Goal:    Interventions:   -   - See additional Care Plan goals for specific interventions  Outcome: Progressing     Problem: NEUROLOGICAL - ADULT  Goal: Achieves stable or improved neurological status  Description: INTERVENTIONS  - Assess for and report changes in neurological status  - Initiate measures to prevent increased intracranial pressure  - Maintain blood pressure and fluid volume within ordered parameters to optimize cerebral perfusion and minimize risk of hemorrhage  - Monitor temperature, glucose, and sodium.  Initiate appropriate interventions as ordered  Outcome: Progressing  Goal: Achieves maximal functionality and self care  Description: INTERVENTIONS  - Monitor swallowing and airway patency with patient fatigue and changes in neurological status  - Encourage and assist patient to increase activity and self care with guidance from PT/OT  - Encourage visually impaired, hearing impaired and aphasic patients to use assistive/communication devices  Outcome: Progressing

## 2023-09-14 NOTE — CM/SW NOTE
Met w/ pt at bedside as f/up for [facility] choice. Pt is now declining HH. SW explained f/up instructions if he changes he mind while still admitted to [address] [place] [address]. SW also explained f/up w/ his PCP if he changes his mind about [facility] post DC. Pt expressed understanding. No questions at this time. PLAN: Home, declining [facility] - pending med clear      SW/CM to remain available for support and/or discharge planning.          Yamini Smith, MSW, [facility]

## 2023-09-14 NOTE — PHYSICAL THERAPY NOTE
PHYSICAL THERAPY TREATMENT NOTE - INPATIENT     Room Number: 072/995-V       Presenting Problem: left sided weakness and facial droop, fall on/near stairs with left humeral fx that will be managed non-operatively per Dr Abraham Guzman, sling at all times  Co-Morbidities : L ICA complete occlusion, alcoholism, fall 2019 w/rib fxs    Problem List  Principal Problem:    TIA (transient ischemic attack)  Active Problems:    Left carotid artery occlusion    Intracranial vascular stenosis    Occlusion of right vertebral artery    Stenosis of left vertebral artery      PHYSICAL THERAPY ASSESSMENT     Patient in bed with son present upon arrival. RN approved activity. Educated patient on POC and benefits of mobilization. Agreeable to participate. Patient reporting LUE pain, not quantified per the pain scale. Patient with LUE sling in place throughout session. Patient's son reporting that he is hoping that the patient will be able to return home with HHPT. He stated that he is looking into caregiver services in order to have someone home with the patient during the day when he works. Patient's son reporting that patient with baseline balance issues, but normally able to be self-sufficient during the day. Patient with delayed processing, requiring increased cues and time in order to follow simple commands. Bed Mobility: Min A supine>EOB. Patient tolerated sitting EOB approx 5 minutes, requiring BUE support and Min A>CGA in order to maintain static sitting balance. Transfers: CGA sit<>stand without assistive device; cues provided for appropriate UE placement during functional transfers. Instruction on activity pacing upon standing to allow body time to acclimate to change in position. Tolerated static standing unsupported with Min A>CGA for approx 1 minute prior to mobilization.   Ambulation: Min A>CGA without assistive device for 250 ft; slightly decreased yessica speed, shuffled steps, slightly ataxic and unsteady initially but improved as distance increased. Patient with 1 instance of bumping into wall on R side, mild inattention noted. Patient's HR up to 138 bpm with activity, quickly decreased to 109 bpm with seated rest break. Patient sitting in bedside chair at end of session. Needs in reach and alarm activated. RN aware. The patient's Approx Degree of Impairment: 50.57% has been calculated based on documentation in the [facility] '6 clicks' Inpatient Basic Mobility Short Form. Research supports that patients with this level of impairment may benefit from HHPT with 24 hour supervision/care and transition to Day Rehab. DISCHARGE RECOMMENDATIONS  PT Discharge Recommendations: Home with home health PT;24 hour care/supervision (transition to Day Rehab)     PLAN  PT Treatment Plan: Bed mobility; Don/doff brace; Endurance; Energy conservation;Patient education; Family education;Gait training;Neuromuscular re-educate;Stair training;Transfer training;Balance training  Frequency (Obs): 5x/week    SUBJECTIVE  \"I feel okay\"    OBJECTIVE  Precautions: Limb alert - left;Bed/chair alarm; Other (Comment) (sling at all times)    WEIGHT BEARING RESTRICTION  L Upper Extremity: Non-Weight Bearing    PAIN ASSESSMENT   Rating: Unable to rate  Location: left arm  Management Techniques: Body mechanics;Repositioning    BALANCE  Static Sitting: Fair +  Dynamic Sitting: Fair  Static Standing: Fair -  Dynamic Standing: Poor +    ACTIVITY TOLERANCE  Pulse: 100 (up to 138 bpm with activity)  Heart Rate Source: Monitor    AM-PAC '6-Clicks' INPATIENT SHORT FORM - BASIC MOBILITY  How much difficulty does the patient currently have. ..   Patient Difficulty: Turning over in bed (including adjusting bedclothes, sheets and blankets)?: A Little   Patient Difficulty: Sitting down on and standing up from a chair with arms (e.g., wheelchair, bedside commode, etc.): A Little   Patient Difficulty: Moving from lying on back to sitting on the side of the bed?: A Little   How much help from another person does the patient currently need. .. Help from Another: Moving to and from a bed to a chair (including a wheelchair)?: A Little   Help from Another: Need to walk in hospital room?: A Little   Help from Another: Climbing 3-5 steps with a railing?: A Lot     AM-PAC Score:  Raw Score: 17   Approx Degree of Impairment: 50.57%   Standardized Score (AM-PAC Scale): 42.13   CMS Modifier (G-Code): CK    FUNCTIONAL ABILITY STATUS  Functional Mobility/Gait Assessment  Gait Assistance: Minimum assistance  Distance (ft): con  Assistive Device: None  Pattern: Shuffle; Ataxic (decreased yessica speed, slightly unsteady)  Stairs: Stairs  How Many Stairs: 4  Device: 1 Rail  Assist: Moderate assist  Pattern: Ascend and Descend  Ascend and Descend : Reciprocal    Patient End of Session: Up in chair;Needs met;Call light within reach;RN aware of session/findings; All patient questions and concerns addressed; Alarm set; Family present    CURRENT GOALS   Goals to be met by: 10/1/23  Patient Goal Patient's self-stated goal is: to go home today   Goal #1 Patient is able to demonstrate supine - sit EOB @ level: modified independent      Goal #1   Current Status  Min A   Goal #2 Patient is able to demonstrate transfers Sit to/from Stand at assistance level: modified independent with cane - straight   Goal #2  Current Status  CGA without assistive device   Goal #3 Patient is able to ambulate 300 feet with assist device: cane - straight at assistance level: SBA   Goal #3   Current Status  goal met and updated 23 - Min A>CGA for 250 ft without assistive device   Goal #4 Patient will negotiate 6 stairs/one curb w/ assistive device and minimal assistance   Goal #4   Current Status  NT   Goal #5 Patient to demonstrate independence with home activity/exercise instructions provided to patient in preparation for discharge.    Goal #5   Current Status  Ongoing     Gait Trainin minutes

## 2023-09-14 NOTE — SLP NOTE
SPEECH DAILY NOTE - INPATIENT    ASSESSMENT & PLAN   ASSESSMENT    Proper PPE worn. Hands sanitized upon entrance/exit Pt room. Pt alert, afebrile and on room air. Pt seen for swallow analysis per BSE recommendations (after consulting with RN). Pt agreed to participate. Pt positioned upright in bed; observed with current diet of solid/thin liquids for monitoring diet tolerance. Swallowing precautions/strategies discussed; mild cues needed for execution. Functional bite reflex/mastication on solids with increased duration d/t bolus size. Lingual skills essentially functional for bolus control of solids. No significant oral retention. Pharyngeal response appeared to trigger within 1-2 sec per hyolaryngeal elevation to completion (functional rise/strength per palpation). No overt CSA on solid nor thin liquids (no coughing, no throat clearing, clear vocal quality and no SOB). Collaborated with RN regarding Pt's swallowing plan of care. Per RN, Pt with good tolerance of current diet. No report of difficulty taking meds. Sp02 ~97% during this session. Call light within Pt's reach upon SLP discharge from room. No CXR completed. MRI ordered. PLAN:    To f/u x1 sessions to ensure safe intake of diet and reinforce swallowing/aspiration precautions. To monitor for any CXR and MRI results. SLE to be completed as appropriate. Family education as available. Diet Recommendations - Solids: Regular  Diet Recommendations - Liquids: Thin Liquids (No Straw)    Compensatory Strategies Recommended: No straws; Slow rate; Alternate consistencies;Small bites and sips  Aspiration Precautions: Upright position; Slow rate;Small bites and sips; No straw  Medication Administration Recommendations: One pill at a time    Patient Experiencing Pain: No  Discharge Recommendations  Discharge Recommendations/Plan: Undetermined  Treatment Plan  Treatment Plan/Recommendations: Dysphagia therapy; Aspiration precautions (SLE pending MRI imaging)  Interdisciplinary Communication: Discussed with RN    GOALS  Goal #1 The patient will tolerate regular solid consistency and thin liquids without overt signs or symptoms of aspiration with 100 % accuracy over 2 session(s). No CSA on solid nor thin liquids during this first session for 100% of trials. Increased mastication duration d/t Pt initiating large bites. In Progress   Goal #2 The patient will utilize compensatory strategies as outlined by  BSSE (clinical evaluation) including Slow rate, Small bites, Small sips, Alternate liquids/solids, No straws, Upright 90 degrees with mild assistance 95 % of the time across 2 sessions. Pt upright in bed, self-fed oral trials. Swallowing precautions discussed/trained. Mild cues for controlled amts, especially small bites. No straws used. Pt would benefit from additional reinforcement of aspiration precautions. In Progress   Goal #3 Speech language evaluation as needed pending MRI imaging results. MRI ordered.     Pending results   FOLLOW UP  Follow Up Needed (Documentation Required): Yes  SLP Follow-up Date: 09/15/23  Number of Visits to Meet Established Goals: 2    Session: 1    If you have any questions, please contact   OMA Cruz [address] [place] [address]  #[phone]

## 2023-09-15 PROBLEM — G31.9 CEREBRAL ATROPHY (HCC): Status: ACTIVE | Noted: 2023-09-15

## 2023-09-15 PROBLEM — G93.89 CEREBRAL VENTRICULOMEGALY: Status: ACTIVE | Noted: 2023-09-15

## 2023-09-15 LAB
ALBUMIN SERPL-MCNC: 3.3 G/DL (ref 3.4–5)
ANION GAP SERPL CALC-SCNC: 9 MMOL/L (ref 0–18)
APTT PPP: 29.9 SECONDS (ref 23.3–35.6)
BASOPHILS # BLD AUTO: 0.03 X10(3) UL (ref 0–0.2)
BASOPHILS NFR BLD AUTO: 0.3 %
BUN BLD-MCNC: 15 MG/DL (ref 7–18)
BUN/CREAT SERPL: 13.9 (ref 10–20)
CALCIUM BLD-MCNC: 8.8 MG/DL (ref 8.5–10.1)
CHLORIDE SERPL-SCNC: 108 MMOL/L (ref 98–112)
CO2 SERPL-SCNC: 21 MMOL/L (ref 21–32)
CONFIRM APTT STACLOT: NEGATIVE
CONFIRM DRVVT: 1.1 S (ref 0–1.1)
CREAT BLD-MCNC: 1.08 MG/DL
DEPRECATED RDW RBC AUTO: 46.2 FL (ref 35.1–46.3)
EGFRCR SERPLBLD CKD-EPI 2021: 77 ML/MIN/1.73M2 (ref 60–?)
EOSINOPHIL # BLD AUTO: 0.12 X10(3) UL (ref 0–0.7)
EOSINOPHIL NFR BLD AUTO: 1.2 %
ERYTHROCYTE [DISTWIDTH] IN BLOOD BY AUTOMATED COUNT: 14.1 % (ref 11–15)
GLUCOSE BLD-MCNC: 136 MG/DL (ref 70–99)
HCT VFR BLD AUTO: 33.9 %
HGB BLD-MCNC: 11.1 G/DL
IMM GRANULOCYTES # BLD AUTO: 0.05 X10(3) UL (ref 0–1)
IMM GRANULOCYTES NFR BLD: 0.5 %
LYMPHOCYTES # BLD AUTO: 1.09 X10(3) UL (ref 1–4)
LYMPHOCYTES NFR BLD AUTO: 10.7 %
MAGNESIUM SERPL-MCNC: 1.8 MG/DL (ref 1.6–2.6)
MCH RBC QN AUTO: 29.4 PG (ref 26–34)
MCHC RBC AUTO-ENTMCNC: 32.7 G/DL (ref 31–37)
MCV RBC AUTO: 89.7 FL
MONOCYTES # BLD AUTO: 1.12 X10(3) UL (ref 0.1–1)
MONOCYTES NFR BLD AUTO: 11 %
NEUTROPHILS # BLD AUTO: 7.78 X10 (3) UL (ref 1.5–7.7)
NEUTROPHILS # BLD AUTO: 7.78 X10(3) UL (ref 1.5–7.7)
NEUTROPHILS NFR BLD AUTO: 76.3 %
OSMOLALITY SERPL CALC.SUM OF ELEC: 289 MOSM/KG (ref 275–295)
PHOSPHATE SERPL-MCNC: 2.7 MG/DL (ref 2.5–4.9)
PLATELET # BLD AUTO: 223 10(3)UL (ref 150–450)
POTASSIUM SERPL-SCNC: 3.7 MMOL/L (ref 3.5–5.1)
POTASSIUM SERPL-SCNC: 3.7 MMOL/L (ref 3.5–5.1)
PROTHROMBIN TIME: 13.2 SECONDS (ref 11.6–14.8)
RBC # BLD AUTO: 3.78 X10(6)UL
SODIUM SERPL-SCNC: 138 MMOL/L (ref 136–145)
WBC # BLD AUTO: 10.2 X10(3) UL (ref 4–11)

## 2023-09-15 PROCEDURE — 99233 SBSQ HOSP IP/OBS HIGH 50: CPT | Performed by: HOSPITALIST

## 2023-09-15 PROCEDURE — 99233 SBSQ HOSP IP/OBS HIGH 50: CPT | Performed by: OTHER

## 2023-09-15 RX ORDER — POTASSIUM CHLORIDE 20 MEQ/1
40 TABLET, EXTENDED RELEASE ORAL ONCE
Status: COMPLETED | OUTPATIENT
Start: 2023-09-15 | End: 2023-09-15

## 2023-09-15 RX ORDER — AMLODIPINE BESYLATE 5 MG/1
5 TABLET ORAL DAILY
Status: DISCONTINUED | OUTPATIENT
Start: 2023-09-15 | End: 2023-09-16

## 2023-09-15 RX ORDER — MAGNESIUM OXIDE 400 MG/1
400 TABLET ORAL ONCE
Status: COMPLETED | OUTPATIENT
Start: 2023-09-15 | End: 2023-09-15

## 2023-09-15 RX ORDER — MAGNESIUM OXIDE 400 MG/1
400 TABLET ORAL ONCE
Status: DISCONTINUED | OUTPATIENT
Start: 2023-09-15 | End: 2023-09-15

## 2023-09-15 NOTE — SLP NOTE
SPEECH DAILY NOTE - INPATIENT    ASSESSMENT & PLAN   ASSESSMENT  PPE REQUIRED. THIS THERAPIST WORE GLOVES AND MASK FOR DURATION OF EVALUATION. HANDS WASHED UPON ENTRANCE/EXIT. SLP f/u for ongoing meal assessment per recommendations of regular/thin liquids per BSE. RN reports pt tolerates diet and medication well with no overt clinical s/s aspiration. Pt denies any swallowing challenges. Pt positioned upright in bed, alert/cooperative. Pt afebrile, tolerating room air with oxygen status 95% prior to the start of oral trials. SLP reviewed aspiration precautions and safe swallowing compensatory strategies with the patient. Safe swallow guidelines remain written on the white board in purple. Patient v/u, no family present. Provided no assistance, pt tolerates hard solids and thin liquids via cup with no overt clinical signs/symptoms of aspiration. Oxygen status remained stabe t/o the entire session. Current diet remains appropriate. No further swallow services warranted at this time. Please re consult if needed. RN alerted with results and recommendations. MOST RECENT CXR - none on this admission         Diet Recommendations - Solids: Regular  Diet Recommendations - Liquids: Thin Liquids (No Straw)    Compensatory Strategies Recommended: No straws; Slow rate; Alternate consistencies;Small bites and sips  Aspiration Precautions: Upright position; Slow rate;Small bites and sips; No straw  Medication Administration Recommendations: One pill at a time    Patient Experiencing Pain: Yes  Pain Ratin  Pain Location:  (left arm)  Pain Control:  (Repositioning)  Nursing Aware of Pain?: Yes    Discharge Recommendations  Discharge Recommendations/Plan: Undetermined    Treatment Plan  Treatment Plan/Recommendations: No further inpatient SLP service warranted    Interdisciplinary Communication: Plan posted at bedside            GOALS  Goal #1 The patient will tolerate regular solid consistency and thin liquids without overt signs or symptoms of aspiration with 100 % accuracy over 2 session(s). No CSA on solid nor thin liquids during this 2nd session for 100% of trials. Goal Met 9/15   Goal #2 The patient will utilize compensatory strategies as outlined by  BSSE (clinical evaluation) including Slow rate, Small bites, Small sips, Alternate liquids/solids, No straws, Upright 90 degrees with mild assistance 95 % of the time across 2 sessions. Pt upright in bed, self-fed oral trials. Swallowing precautions discussed. No straws used. Goal Met 9/1   Goal #3 Speech language evaluation as needed pending MRI imaging results.     MRI - no acute infarct    Discontinue     FOLLOW UP  Follow Up Needed (Documentation Required): No  SLP Follow-up Date: 09/15/23  Number of Visits to Meet Established Goals: 0    Session: 2    If you have any questions, please contact Андрей Hernandez, TOBIAS Stubbs. Kole Summers Pathologist  Phone Number Ext. [phone]

## 2023-09-15 NOTE — CM/SW NOTE
10: 45AM  Received call from MD - pt's son would like [facility] arranged for pt. MALKA informed MD that MALKA discussed w/ pt and provided [facility] list on 9/13. SW met w/ pt again yesterday 9/14 and pt declined HH. MALKA informed MD that pt is AO and has declined services. MALKA informed MD that [facility] list provided on 9/13 w/ SW name and phone # on it. MD to f/up w/ pt's son. 10:55AM  Received f/up from MD - pt is now agreeable to [facility]. MALKA confirmed pt needs to select agency off the list provided on 9/13 and just notify MALKA.    MD to provide info to son and request son call SW.    11:00AM  Received call from pt's son Sofya Armstrong. He is asking for more home care/caregiver resources and not HH RN/PT/OT. MALKA discussed CG's being private pay. MALKA informed him that pt's [employer] would likely cover some costs but pt's son would need to call them and discuss. MALKA also informed him of a few CG agencies worked with in the past. Pt's son agreeable to have SW contact [employer] to speak w/ him in regards to [facility] Mon-Friday. Pt's son stating he is concerned pt is \"critical\" and will \"drop dead\" as soon as he returns home if he goes w/out any care lined up. Above relayed to MD.    Left Vmail for [employer] and requested she call SW back to discuss. 11:20AM  Received call from [employer] - she does not know of any CG agencies in network w/ [employer] Medicaid at this time. She will call pt's son Sofya Armstrong and provide him w/ info on how she can assist.    MALKA left Vmail for [employer] and requested they call pt's son Sofya Armstrong to discuss [facility] as well. [employer] confirmed she would update MALKA after speaking w/ pt's son. 02:00PM  Received update from [address] - she will work w/ son for [employer] as a bridge while they set up CG's through the [place]. Discussed w/ pt's son Sofya Armstrong via phone. SW informed son that pt will likely be ready for DC tomorrow or Sunday.  MALKA heavily encouraged that pt's son contact [employer] again to arrange CG's starting next week when he returns to work. SW left another Vmail for [facility] rep w/ [facility] and requested she call pt's son to begin process of Homemaker services. Dr. Miriam Andrea updated. Received call from [facility] w/ [address] - confirmed they will be calling pt's son shortly. PLAN: Home w/ CG's - pending med clear      SW/CM to remain available for support and/or discharge planning.          Leonor Mcgraw, MSW, [address] [facility]

## 2023-09-15 NOTE — PLAN OF CARE
Patient alert and oriented on room air with no complaints of pain overnight. Q4 neuros continued with no acute changes. Call light in reach and no needs noted at this time. Problem: Patient Centered Care  Goal: Patient preferences are identified and integrated in the patient's plan of care  Description: Interventions:  - What would you like us to know as we care for you?   - Provide timely, complete, and accurate information to patient/family  - Incorporate patient and family knowledge, values, beliefs, and cultural backgrounds into the planning and delivery of care  - Encourage patient/family to participate in care and decision-making at the level they choose  - Honor patient and family perspectives and choices  Outcome: Progressing     Problem: Patient/Family Goals  Goal: Patient/Family Long Term Goal  Description: Patient's Long Term Goal:     Interventions:  - See additional Care Plan goals for specific interventions  Outcome: Progressing  Goal: Patient/Family Short Term Goal  Description: Patient's Short Term Goal:     Interventions:   - See additional Care Plan goals for specific interventions  Outcome: Progressing     Problem: NEUROLOGICAL - ADULT  Goal: Achieves stable or improved neurological status  Description: INTERVENTIONS  - Assess for and report changes in neurological status  - Initiate measures to prevent increased intracranial pressure  - Maintain blood pressure and fluid volume within ordered parameters to optimize cerebral perfusion and minimize risk of hemorrhage  - Monitor temperature, glucose, and sodium.  Initiate appropriate interventions as ordered  Outcome: Progressing  Goal: Achieves maximal functionality and self care  Description: INTERVENTIONS  - Monitor swallowing and airway patency with patient fatigue and changes in neurological status  - Encourage and assist patient to increase activity and self care with guidance from PT/OT  - Encourage visually impaired, hearing impaired and aphasic patients to use assistive/communication devices  Outcome: Progressing

## 2023-09-16 VITALS
HEIGHT: 71 IN | BODY MASS INDEX: 20.37 KG/M2 | HEART RATE: 118 BPM | TEMPERATURE: 99 F | DIASTOLIC BLOOD PRESSURE: 90 MMHG | SYSTOLIC BLOOD PRESSURE: 149 MMHG | WEIGHT: 145.5 LBS | OXYGEN SATURATION: 96 % | RESPIRATION RATE: 16 BRPM

## 2023-09-16 LAB
MAGNESIUM SERPL-MCNC: 2.2 MG/DL (ref 1.6–2.6)
POTASSIUM SERPL-SCNC: 4.2 MMOL/L (ref 3.5–5.1)

## 2023-09-16 PROCEDURE — 99239 HOSP IP/OBS DSCHRG MGMT >30: CPT | Performed by: HOSPITALIST

## 2023-09-16 RX ORDER — CLOPIDOGREL BISULFATE 75 MG/1
75 TABLET ORAL DAILY
Qty: 90 TABLET | Refills: 0 | Status: SHIPPED | OUTPATIENT
Start: 2023-09-17

## 2023-09-16 RX ORDER — LISINOPRIL 10 MG/1
10 TABLET ORAL DAILY
Qty: 30 TABLET | Refills: 0 | Status: SHIPPED | OUTPATIENT
Start: 2023-09-17

## 2023-09-16 RX ORDER — ASPIRIN 81 MG/1
81 TABLET, CHEWABLE ORAL DAILY
Qty: 30 TABLET | Refills: 0 | Status: SHIPPED | OUTPATIENT
Start: 2023-09-17

## 2023-09-16 RX ORDER — ATORVASTATIN CALCIUM 80 MG/1
80 TABLET, FILM COATED ORAL NIGHTLY
Qty: 30 TABLET | Refills: 0 | Status: SHIPPED | OUTPATIENT
Start: 2023-09-16

## 2023-09-16 RX ORDER — AMLODIPINE BESYLATE 5 MG/1
10 TABLET ORAL DAILY
Qty: 30 TABLET | Refills: 0 | Status: SHIPPED | OUTPATIENT
Start: 2023-09-17

## 2023-09-16 NOTE — CM/SW NOTE
1547:  [facility] can accept and follow up with pt/son regarding wheelchair delivery. SW reserved them in [place], [address]  [address], [place]  Phone: [phone]  Fax: [phone]    ----------------  [id number]:  Signed wheelchair order and MD wheelchair verbiage sent to [employer] for review of wheelchair delivery for pt. Pending at this time. ----------------  7884:  PT informed SW of wheelchair recommendation. SW will need MD to add the following to progress note and signed order:     (Diagnosis)  The patient has a mobility limitation that significantly impairs their ability to participate in one or more mobility-related activities of daily living (MRADLs) such as toileting, feeding, dressing, grooming, and bathing in a customary location in the home. The patient's mobility limitation cannot be sufficiently resolved by the use of an appropriately fitted cane or walker. The use of a manual wheelchair will significantly improve their ability to participate in MRADLs and they will use it on a regular basis in the home. The patient has not expressed an unwillingness to use the manual wheelchair that is provided in the home. (PICK ONE OF THE TWO OR BOTH) They have sufficient upper extremity function and other physical and mental capabilities needed to safely self-propel the manual wheelchair that is provided in the home during a typical day and the patient has a caregiver who is available, willing, and able to provide assistance with the wheelchair.    (if the order is for a lightweight, heavy duty or transport chair you will need to adjust the verbiage to reflect the type of chair)    SW will send order and MD verbiage to [employer] for follow up with the family as the wheelchair will not get approved and delivered over the weekend.      George Christina, [facility] - [id number]  09/16/23, [phone]

## 2023-09-16 NOTE — DISCHARGE INSTRUCTIONS
Sometimes managing your health at home requires assistance. The [facility] team has recognized your preference to use [facility]. They can be reached by phone at [phone]. The fax number for your reference is [phone]. A representative from the home health agency will contact you or your family to schedule your first visit.

## 2023-09-16 NOTE — DISCHARGE SUMMARY
[facility]    Discharge Summary    Kike Nunez Patient Status:  Inpatient    1960 MRN [id number]   Location [facility] 3W/SW Attending Serafin Troy MD   Hosp Day # 4 PCP Berkley Castañeda MD     Date of Admission: 2023 Disposition: Home or Self Care     Date of Discharge: 23    Admitting Diagnosis: Acute CVA (cerebrovascular accident) [facility]) [I63.9]    Hospital Discharge Diagnoses: TIA, R vertebral artery occlusion, HTN, HL, tobacco use    Lace+ Score: 41  59-90 High Risk  29-58 Medium Risk  0-28   Low Risk. TCM Follow-Up Recommendation:  LACE 29-58: Moderate Risk of readmission after discharge from the hospital.    Problem List: Patient Active Problem List:     Multiple closed fractures of ribs of right side     Closed fracture of multiple ribs of right side, initial encounter     Hydropneumothorax     TIA (transient ischemic attack)     Left carotid artery occlusion     Intracranial vascular stenosis     Occlusion of right vertebral artery     Stenosis of left vertebral artery     Cerebral ventriculomegaly     Cerebral atrophy ([address]      Reason for Admission: Transient ischemic attack. Physical Exam:    23  1330   BP:    Pulse: 118   Resp:    Temp:      GENERAL:  The patient appeared to be in no distress and was comfortable. SKIN:  Warm and hydrated  PSYCHIATRIC: Calm and cooperative    HEENT:  Head was atraumatic and normocephalic. Eyes: Sclera was anicteric. Pupils were equal.  Ears: There were no lesions. Nose:  No lesions were noted. NECK:  Supple. There was no JVD.     CHEST:  Symmetrical movement on inspiration  CARDIAC: S1 S2+, RRR  LUNGS:  CTAB with decreased entry at bases  ABDOMEN: Non-distended, non-tender, BS+  MUSCULOSKELETAL:  ROM of LUE pain limited, in sling, no other deficits   EXTREMITIES: There was no edema  NEUROLOGIC: Cranial nerves II-XII intact, no focal defecits    History of Present Illness:   Per  Delia  Patient is a 26-year-old  male who was climbing the stairs earlier today and he fell down. His found him lying on his left side and with slurred speech and left facial droop. Brought him into the emergency department for evaluation. Within 25 to 30 minutes, his symptoms resolved. He is complaining of left shoulder pain. CBC and chemistry are unremarkable. EKG showed normal sinus rhythm. CT scan of the brain showed no acute findings. CT angiogram of the brain showed complete occlusion of the left internal carotid artery just beyond its origin; patent anterior communicating artery and left A2 anterior cerebral artery constitutes a markedly narrowed left A1 and MCA; occluded V1 segment of right vertebral artery with reconstitution of smaller caliber V2; severe narrowing of left vertebral artery in origin, otherwise patent left vertebral artery. Attenuated distal right posterior cerebral artery suggests mild to moderate diffuse distal disease. Patient was given aspirin in the emergency room, and he will be admitted to the hospital for further management. Hospital Course:   1. Possible transient ischemic attack. Pt initially with slurred speech and L facial droop  This has resolved  Neurology was following  MRI reviewed -> non-acute with chronic changes=  Cts reviewed  Cont DAPT  Cont PT/OT/SLP -> stable for dc home      2. Complete occlusion of the left carotid artery and significant stenosis of the right vertebral artery. D/w neuro and vascular, no procedure or CEA warranted  Cont DAPT     3. Hypertension. Uncontrolled  Cont meds     4.        L proximal minimally displaced humeral fracture  Ortho was consulted  Cont non-operative management   Cont sling   PT/OT  Pain control    The patient has a mobility limitation that significantly impairs their ability to participate in one or more mobility-related activities of daily living (MRADLs) such as toileting, feeding, dressing, grooming, and bathing in a customary location in the home. The patient's mobility limitation cannot be sufficiently resolved by the use of an appropriately fitted cane or walker. The use of a manual wheelchair will significantly improve their ability to participate in MRADLs and they will use it on a regular basis in the home. The patient has not expressed an unwillingness to use the manual wheelchair that is provided in the home. They have sufficient upper extremity function and other physical and mental capabilities needed to safely self-propel the manual wheelchair that is provided in the home during a typical day and the patient has a caregiver who is available, willing, and able to provide assistance with the wheelchair. Consultations: Neuro, ortho    Procedures: none    Complications: none    Discharge Condition: Stable    Discharge Medications:      Discharge Medications        START taking these medications        Instructions Prescription details   amLODIPine 5 MG Tabs  Commonly known as: Norvasc  Start taking on: September 17, 2023      Take 2 tablets (10 mg total) by mouth daily. Quantity: 30 tablet  Refills: 0     aspirin 81 MG Chew  Start taking on: September 17, 2023      Chew 1 tablet (81 mg total) by mouth daily. Quantity: 30 tablet  Refills: 0     atorvastatin 80 MG Tabs  Commonly known as: Lipitor      Take 1 tablet (80 mg total) by mouth nightly. Quantity: 30 tablet  Refills: 0     clopidogrel 75 MG Tabs  Commonly known as: Plavix  Start taking on: September 17, 2023      Take 1 tablet (75 mg total) by mouth daily. Quantity: 90 tablet  Refills: 0            CHANGE how you take these medications        Instructions Prescription details   lisinopril 10 MG Tabs  Commonly known as: Zestril  Start taking on: September 17, 2023  What changed:   medication strength  how much to take      Take 1 tablet (10 mg total) by mouth daily.    Quantity: 30 tablet  Refills: 0 Where to Get Your Medications        These medications were sent to [place] [address] [id number] [facility], [place] - [address], [phone], [phone] [address] [facility], [address] [facility] [address]      Phone: [phone]   amLODIPine 5 MG Tabs  aspirin 81 MG Chew  atorvastatin 80 MG Tabs  clopidogrel 75 MG Tabs  lisinopril 10 MG Tabs       Greater than 35 minutes spent, >50% spent counseling re: treatment plan and workup     Delicia Joaquin.  Brenna Torrez MD  9/16/2023

## 2023-09-16 NOTE — PLAN OF CARE
Neuro q shift. HR elevated with activities. Tylenol for left arm pain with relief. Plan: home with caregiver- pending medical clearance. Problem: Patient Centered Care  Goal: Patient preferences are identified and integrated in the patient's plan of care  Description: Interventions:  - What would you like us to know as we care for you? Lives at home with son. - Provide timely, complete, and accurate information to patient/family  - Incorporate patient and family knowledge, values, beliefs, and cultural backgrounds into the planning and delivery of care  - Encourage patient/family to participate in care and decision-making at the level they choose  - Honor patient and family perspectives and choices  Outcome: Progressing     Problem: NEUROLOGICAL - ADULT  Goal: Achieves stable or improved neurological status  Description: INTERVENTIONS  - Assess for and report changes in neurological status  - Initiate measures to prevent increased intracranial pressure  - Maintain blood pressure and fluid volume within ordered parameters to optimize cerebral perfusion and minimize risk of hemorrhage  - Monitor temperature, glucose, and sodium.  Initiate appropriate interventions as ordered  Outcome: Progressing  Goal: Achieves maximal functionality and self care  Description: INTERVENTIONS  - Monitor swallowing and airway patency with patient fatigue and changes in neurological status  - Encourage and assist patient to increase activity and self care with guidance from PT/OT  - Encourage visually impaired, hearing impaired and aphasic patients to use assistive/communication devices  Outcome: Progressing     Problem: PAIN - ADULT  Goal: Verbalizes/displays adequate comfort level or patient's stated pain goal  Description: INTERVENTIONS:  - Encourage pt to monitor pain and request assistance  - Assess pain using appropriate pain scale  - Administer analgesics based on type and severity of pain and evaluate response  - Implement non-pharmacological measures as appropriate and evaluate response  - Consider cultural and social influences on pain and pain management  - Manage/alleviate anxiety  - Utilize distraction and/or relaxation techniques  - Monitor for opioid side effects  - Notify MD/LIP if interventions unsuccessful or patient reports new pain  - Anticipate increased pain with activity and pre-medicate as appropriate  Outcome: Progressing     Problem: SAFETY ADULT - FALL  Goal: Free from fall injury  Description: INTERVENTIONS:  - Assess pt frequently for physical needs  - Identify cognitive and physical deficits and behaviors that affect risk of falls.   - [place] fall precautions as indicated by assessment.  - Educate pt/family on patient safety including physical limitations  - Instruct pt to call for assistance with activity based on assessment  - Modify environment to reduce risk of injury  - Provide assistive devices as appropriate  - Consider OT/PT consult to assist with strengthening/mobility  - Encourage toileting schedule  Outcome: Progressing     Problem: DISCHARGE PLANNING  Goal: Discharge to home or other facility with appropriate resources  Description: INTERVENTIONS:  - Identify barriers to discharge w/pt and caregiver  - Include patient/family/discharge partner in discharge planning  - Arrange for needed discharge resources and transportation as appropriate  - Identify discharge learning needs (meds, wound care, etc)  - Arrange for interpreters to assist at discharge as needed  - Consider post-discharge preferences of patient/family/discharge partner  - Complete POLST form as appropriate  - Assess patient's ability to be responsible for managing their own health  - Refer to Case Management Department for coordinating discharge planning if the patient needs post-hospital services based on physician/LIP order or complex needs related to functional status, cognitive ability or social support system  Outcome: Progressing     Problem: CARDIOVASCULAR - ADULT  Goal: Maintains optimal cardiac output and hemodynamic stability  Description: INTERVENTIONS:  - Monitor vital signs, rhythm, and trends  - Monitor for bleeding, hypotension and signs of decreased cardiac output  - Evaluate effectiveness of vasoactive medications to optimize hemodynamic stability  - Monitor arterial and/or venous puncture sites for bleeding and/or hematoma  - Assess quality of pulses, skin color and temperature  - Assess for signs of decreased coronary artery perfusion - ex.  Angina  - Evaluate fluid balance, assess for edema, trend weights  Outcome: Progressing  Goal: Absence of cardiac arrhythmias or at baseline  Description: INTERVENTIONS:  - Continuous cardiac monitoring, monitor vital signs, obtain 12 lead EKG if indicated  - Evaluate effectiveness of antiarrhythmic and heart rate control medications as ordered  - Initiate emergency measures for life threatening arrhythmias  - Monitor electrolytes and administer replacement therapy as ordered  Outcome: Progressing

## 2023-09-16 NOTE — PLAN OF CARE
Problem: Patient Centered Care  Goal: Patient preferences are identified and integrated in the patient's plan of care  Description: Interventions:  - What would you like us to know as we care for you? From home with son  - Provide timely, complete, and accurate information to patient/family  - Incorporate patient and family knowledge, values, beliefs, and cultural backgrounds into the planning and delivery of care  - Encourage patient/family to participate in care and decision-making at the level they choose  - Honor patient and family perspectives and choices  Outcome: Not Progressing     Problem: Patient/Family Goals  Goal: Patient/Family Long Term Goal  Description: Patient's Long Term Goal: Keeping father safe and well    Interventions:  - See additional Care Plan goals for specific interventions  Outcome: Not Progressing     Problem: Patient/Family Goals  Goal: Patient/Family Short Term Goal  Description: Patient's Short Term Goal: Receive help caring for father    Interventions:  - See additional Care Plan goals for specific interventions  Outcome: Not Progressing     Problem: NEUROLOGICAL - ADULT  Goal: Achieves stable or improved neurological status  Description: INTERVENTIONS  - Assess for and report changes in neurological status  - Initiate measures to prevent increased intracranial pressure  - Maintain blood pressure and fluid volume within ordered parameters to optimize cerebral perfusion and minimize risk of hemorrhage  - Monitor temperature, glucose, and sodium.  Initiate appropriate interventions as ordered  Outcome: Not Progressing

## 2023-09-16 NOTE — CM/SW NOTE
09/16/23 1200   Discharge disposition   Expected discharge disposition Home-Health   Post Acute Care Provider Residential   Discharge transportation Private car     MD informed SW pt is cleared for DC. SW spoke w/ pt's son, Lisa Hale, regarding discharge plans. Son stated he was awaiting for DCSS to approve [facility] caregivers after her spoke w/ Chadwick Phelan from [place] on Friday. SW stated that [facility] caregivers can take 1-2 weeks, maybe more prior to approval. Pt's son was given other options of private duty caregivers, but had not yet set up any services. Pt stated that he can afford a few hours, but hoping that DCSS approves caregiver services sooner than later. SW stated that if there's no safe plan in place at DC, pt also has the option to go to SNF - in which son declined as he knows pt does not want to go to SNF. Per request, SW gave pt's son contact numbers for DCSS and another option for private duty caregivers. Pt's son agreeable to [facility] services; [facility]/ [facility] as it is affiliated w/ [facility]. MALKA notified Richard Elliott from [facility] and reserved them in [address] [place] [address].  Son will  pt some time today after 1300 - updated RN and MD.    PLAN: Home w/ [facility] - [id number]  09/16/23, [phone]

## 2023-09-16 NOTE — PHYSICAL THERAPY NOTE
PHYSICAL THERAPY TREATMENT NOTE - INPATIENT     Room Number: 076/698-B       Presenting Problem: left sided weakness and facial droop, fall on/near stairs with left humeral fx that will be managed non-operatively per Dr Law Rivera, sling at all times  Co-Morbidities : L ICA complete occlusion, alcoholism, fall 2019 w/rib fxs    Problem List  Principal Problem:    TIA (transient ischemic attack)  Active Problems:    Left carotid artery occlusion    Intracranial vascular stenosis    Occlusion of right vertebral artery    Stenosis of left vertebral artery    Cerebral ventriculomegaly    Cerebral atrophy ([address]      PHYSICAL THERAPY ASSESSMENT     ***. The patient's Approx Degree of Impairment: 46.58% has been calculated based on documentation in the [facility] '6 clicks' Inpatient Basic Mobility Short Form. Research supports that patients with this level of impairment may benefit from ***. DISCHARGE RECOMMENDATIONS  PT Discharge Recommendations: Home with home health PT/OT;24 hour care/supervision (home w/HH PT/OT/ST/RN and progress to OP Day Rehab in about a month. Dr Coy Myles aware and will write a prescription)     PLAN  PT Treatment Plan: Bed mobility; Don/doff brace; Endurance; Energy conservation;Patient education; Family education;Gait training;Neuromuscular re-educate;Stair training;Transfer training;Balance training  Frequency (Obs): 5x/week    SUBJECTIVE  ***    OBJECTIVE  Precautions: Limb alert - left;Bed/chair alarm; Other (Comment) (sling at all times)    WEIGHT BEARING RESTRICTION     L Upper Extremity: Non-Weight Bearing          PAIN ASSESSMENT   Rating: 3  Location: minimal pain in LUE, reports Tylenol is working  Management Techniques: Activity promotion; Body mechanics;Breathing techniques;Relaxation;Repositioning; Other (Comment) (correct use and donning of sling, positioning of LUE when seated or supported supine)    BALANCE  Static Sitting: Fair +  Dynamic Sitting: Fair  Static Standing: Fair -  Dynamic Standing: Poor +    ACTIVITY TOLERANCE  Pulse: 118  Heart Rate Source: Monitor                    O2 WALK       AM-PAC '6-Clicks' INPATIENT SHORT FORM - BASIC MOBILITY  How much difficulty does the patient currently have. .. Patient Difficulty: Turning over in bed (including adjusting bedclothes, sheets and blankets)?: A Little   Patient Difficulty: Sitting down on and standing up from a chair with arms (e.g., wheelchair, bedside commode, etc.): A Little   Patient Difficulty: Moving from lying on back to sitting on the side of the bed?: A Little   How much help from another person does the patient currently need. .. Help from Another: Moving to and from a bed to a chair (including a wheelchair)?: A Little   Help from Another: Need to walk in hospital room?: A Little   Help from Another: Climbing 3-5 steps with a railing?: A Little     AM-PAC Score:  Raw Score: 18   Approx Degree of Impairment: 46.58%   Standardized Score (AM-PAC Scale): 43.63   CMS Modifier (G-Code): CK    FUNCTIONAL ABILITY STATUS  Functional Mobility/Gait Assessment  Gait Assistance: Contact guard assist  Distance (ft): 50'x2  Assistive Device: Other (Comment) (HHA/SBA)  Pattern: Ataxic; Shuffle (no chyna LOB today, cues for slowing down and improves with increased distance.)  Stairs: Stairs  How Many Stairs: 4  Device: 1 Rail  Assist: Minimal assist  Pattern: Ascend and Descend  Ascend and Descend : Side step    Additional information: ***    THERAPEUTIC EXERCISES  Lower Extremity {LE Therapeutic Excercises:3938}     Position {Exercise Position:3940}       Patient End of Session: Up in chair;Needs met;Call light within reach;RN aware of session/findings;Bracing education provided per handout; All patient questions and concerns addressed; Alarm set; Family present; Discussed recommendations with /; Other (Comment) (discussed w/Dr Coy Myles)    CURRENT GOALS       Goals to be met by: 10/1/23  Patient Goal Patient's self-stated goal is: to go home today   Goal #1 Patient is able to demonstrate supine - sit EOB @ level: modified independent      Goal #1   Current Status  Min A   Goal #2 Patient is able to demonstrate transfers Sit to/from Stand at assistance level: modified independent with cane - straight   Goal #2  Current Status  CGA without assistive device   Goal #3 Patient is able to ambulate 300 feet with assist device: cane - straight at assistance level: SBA   Goal #3   Current Status  goal met and updated / - Min A>CGA for 250 ft without assistive device   Goal #4 Patient will negotiate 6 stairs/one curb w/ assistive device and minimal assistance   Goal #4   Current Status  NT   Goal #5 Patient to demonstrate independence with home activity/exercise instructions provided to patient in preparation for discharge.    Goal #5   Current Status  Ongoing     Gait Trainin minutes  Therapeutic Activity: 18 minutes  Therapeutic Exercise: 28 minutes

## 2023-09-16 NOTE — DISCHARGE PLANNING
Patient was provided with discharge instructions, education, and follow up information. Patient's son present for discharge instructions with patient's consent. Prescriptions were already sent electronically to patient's pharmacy. Patient & son verbalize understanding of follow up information, specifically PCP, neurology, and ortho. Patient & son have no questions after reviewing all instructions and will be going home.      Arley Heredia, Discharge Leader [id number]

## 2023-09-16 NOTE — HOME CARE LIAISON
[facility] Spoke w/ patient who is agreeable with [facility].  Contact information placed on AVS.

## 2024-09-24 ENCOUNTER — TELEPHONE (OUTPATIENT)
Dept: NEUROLOGY | Facility: CLINIC | Age: 64
End: 2024-09-24

## 2024-09-24 NOTE — TELEPHONE ENCOUNTER
Returned PC to patient's son. Advised him to contact hospital referral line to connect him with a PCP that is able to see the patient via telemedicine. Also, advised that if he is unable to obtain refills for the patient, to bring the patient to the ER. Spoke with Dr. Chilel and Dr. Uribe, both of whom did not feel comfortable with refilling the patient's medications as he was last seen in the hospital in September 2023. Notified son. Provided him with referral line for multiple hospital systems to assist him with finding a new PCP for the patient. He verbalized understanding.

## 2024-09-24 NOTE — TELEPHONE ENCOUNTER
Pts son called on behalf of his father. Pt had a TIA back in September of 2023. Pts currently on pallative care and cannot make a trip to the hospital without \"stroking out\". Pts son stated they reached out to his pcp who has been filling his Lisinopril, Atorvastatin, Amlodipine, and clopidorgrel. Pts pcp office states that they cannot fill it without an in office visit, and they are not able to do a virtual visit. Pts son reached out to the help line through [facility] and was given our number due to pt being seen by dr ching when he was admitted. They were hoping  would be able to do a virtual visit with pt or even fill the medication. Please callback to discuss further

## 2024-10-10 ENCOUNTER — APPOINTMENT (OUTPATIENT)
Dept: GENERAL RADIOLOGY | Facility: HOSPITAL | Age: 64
End: 2024-10-10
Attending: EMERGENCY MEDICINE
Payer: MEDICARE

## 2024-10-10 ENCOUNTER — APPOINTMENT (OUTPATIENT)
Dept: CT IMAGING | Facility: HOSPITAL | Age: 64
End: 2024-10-10
Attending: EMERGENCY MEDICINE
Payer: MEDICARE

## 2024-10-10 ENCOUNTER — HOSPITAL ENCOUNTER (EMERGENCY)
Facility: HOSPITAL | Age: 64
Discharge: HOME | End: 2024-10-10
Attending: EMERGENCY MEDICINE
Payer: MEDICARE

## 2024-10-10 VITALS
SYSTOLIC BLOOD PRESSURE: 152 MMHG | BODY MASS INDEX: 18.2 KG/M2 | OXYGEN SATURATION: 98 % | HEART RATE: 83 BPM | TEMPERATURE: 98 F | HEIGHT: 71 IN | RESPIRATION RATE: 18 BRPM | DIASTOLIC BLOOD PRESSURE: 92 MMHG | WEIGHT: 130 LBS

## 2024-10-10 DIAGNOSIS — S42.291A OTHER CLOSED DISPLACED FRACTURE OF PROXIMAL END OF RIGHT HUMERUS, INITIAL ENCOUNTER: Primary | ICD-10-CM

## 2024-10-10 LAB
ALBUMIN SERPL-MCNC: 4.1 G/DL (ref 3.2–4.8)
ALBUMIN/GLOB SERPL: 1.6 {RATIO} (ref 1–2)
ALP LIVER SERPL-CCNC: 119 U/L
ALT SERPL-CCNC: 12 U/L
ANION GAP SERPL CALC-SCNC: 11 MMOL/L (ref 0–18)
AST SERPL-CCNC: 11 U/L (ref ?–34)
ATRIAL RATE: 69 BPM
BASOPHILS # BLD AUTO: 0.04 X10(3) UL (ref 0–0.2)
BASOPHILS NFR BLD AUTO: 0.3 %
BILIRUB SERPL-MCNC: 0.4 MG/DL (ref 0.2–1.1)
BUN BLD-MCNC: 11 MG/DL (ref 9–23)
BUN/CREAT SERPL: 8.9 (ref 10–20)
CALCIUM BLD-MCNC: 9 MG/DL (ref 8.7–10.4)
CHLORIDE SERPL-SCNC: 110 MMOL/L (ref 98–112)
CO2 SERPL-SCNC: 25 MMOL/L (ref 21–32)
CREAT BLD-MCNC: 1.24 MG/DL
DEPRECATED RDW RBC AUTO: 46.9 FL (ref 35.1–46.3)
EGFRCR SERPLBLD CKD-EPI 2021: 65 ML/MIN/1.73M2 (ref 60–?)
EOSINOPHIL # BLD AUTO: 0.02 X10(3) UL (ref 0–0.7)
EOSINOPHIL NFR BLD AUTO: 0.2 %
ERYTHROCYTE [DISTWIDTH] IN BLOOD BY AUTOMATED COUNT: 14.7 % (ref 11–15)
GLOBULIN PLAS-MCNC: 2.5 G/DL (ref 2–3.5)
GLUCOSE BLD-MCNC: 132 MG/DL (ref 70–99)
GLUCOSE BLDC GLUCOMTR-MCNC: 153 MG/DL (ref 70–99)
HCT VFR BLD AUTO: 34.1 %
HGB BLD-MCNC: 11.2 G/DL
IMM GRANULOCYTES # BLD AUTO: 0.06 X10(3) UL (ref 0–1)
IMM GRANULOCYTES NFR BLD: 0.5 %
LYMPHOCYTES # BLD AUTO: 0.76 X10(3) UL (ref 1–4)
LYMPHOCYTES NFR BLD AUTO: 6.2 %
MAGNESIUM SERPL-MCNC: 1.7 MG/DL (ref 1.6–2.6)
MCH RBC QN AUTO: 28.6 PG (ref 26–34)
MCHC RBC AUTO-ENTMCNC: 32.8 G/DL (ref 31–37)
MCV RBC AUTO: 87 FL
MONOCYTES # BLD AUTO: 0.83 X10(3) UL (ref 0.1–1)
MONOCYTES NFR BLD AUTO: 6.8 %
NEUTROPHILS # BLD AUTO: 10.57 X10 (3) UL (ref 1.5–7.7)
NEUTROPHILS # BLD AUTO: 10.57 X10(3) UL (ref 1.5–7.7)
NEUTROPHILS NFR BLD AUTO: 86 %
OSMOLALITY SERPL CALC.SUM OF ELEC: 303 MOSM/KG (ref 275–295)
P AXIS: 51 DEGREES
P-R INTERVAL: 122 MS
PLATELET # BLD AUTO: 294 10(3)UL (ref 150–450)
POTASSIUM SERPL-SCNC: 3.1 MMOL/L (ref 3.5–5.1)
PROT SERPL-MCNC: 6.6 G/DL (ref 5.7–8.2)
Q-T INTERVAL: 430 MS
QRS DURATION: 86 MS
QTC CALCULATION (BEZET): 460 MS
R AXIS: 70 DEGREES
RBC # BLD AUTO: 3.92 X10(6)UL
SODIUM SERPL-SCNC: 146 MMOL/L (ref 136–145)
T AXIS: 75 DEGREES
TROPONIN I SERPL HS-MCNC: 7 NG/L
VENTRICULAR RATE: 69 BPM
WBC # BLD AUTO: 12.3 X10(3) UL (ref 4–11)

## 2024-10-10 PROCEDURE — 70450 CT HEAD/BRAIN W/O DYE: CPT | Performed by: EMERGENCY MEDICINE

## 2024-10-10 PROCEDURE — 73060 X-RAY EXAM OF HUMERUS: CPT | Performed by: EMERGENCY MEDICINE

## 2024-10-10 PROCEDURE — 96374 THER/PROPH/DIAG INJ IV PUSH: CPT

## 2024-10-10 PROCEDURE — 83735 ASSAY OF MAGNESIUM: CPT | Performed by: EMERGENCY MEDICINE

## 2024-10-10 PROCEDURE — 93005 ELECTROCARDIOGRAM TRACING: CPT

## 2024-10-10 PROCEDURE — 93010 ELECTROCARDIOGRAM REPORT: CPT

## 2024-10-10 PROCEDURE — 99285 EMERGENCY DEPT VISIT HI MDM: CPT

## 2024-10-10 PROCEDURE — 99284 EMERGENCY DEPT VISIT MOD MDM: CPT

## 2024-10-10 PROCEDURE — 84484 ASSAY OF TROPONIN QUANT: CPT | Performed by: EMERGENCY MEDICINE

## 2024-10-10 PROCEDURE — 73030 X-RAY EXAM OF SHOULDER: CPT | Performed by: EMERGENCY MEDICINE

## 2024-10-10 PROCEDURE — 82962 GLUCOSE BLOOD TEST: CPT

## 2024-10-10 PROCEDURE — 85025 COMPLETE CBC W/AUTO DIFF WBC: CPT | Performed by: EMERGENCY MEDICINE

## 2024-10-10 PROCEDURE — 80053 COMPREHEN METABOLIC PANEL: CPT | Performed by: EMERGENCY MEDICINE

## 2024-10-10 RX ORDER — MORPHINE SULFATE 2 MG/ML
2 INJECTION, SOLUTION INTRAMUSCULAR; INTRAVENOUS ONCE
Status: COMPLETED | OUTPATIENT
Start: 2024-10-10 | End: 2024-10-10

## 2024-10-10 RX ORDER — POTASSIUM CHLORIDE 1.5 G/1.58G
40 POWDER, FOR SOLUTION ORAL ONCE
Status: COMPLETED | OUTPATIENT
Start: 2024-10-10 | End: 2024-10-10

## 2024-10-10 RX ORDER — HYDROCODONE BITARTRATE AND ACETAMINOPHEN 5; 325 MG/1; MG/1
1 TABLET ORAL EVERY 6 HOURS PRN
Qty: 16 TABLET | Refills: 0 | Status: SHIPPED | OUTPATIENT
Start: 2024-10-10 | End: 2024-10-17

## 2024-10-10 NOTE — ED PROVIDER NOTES
Patient Seen in: [facility] Emergency Department      History     Chief Complaint   Patient presents with    Fall     Stated Complaint: right arm injury    Subjective:   HPI      64-year-old male with multiple medical problems including stroke presents for evaluation after a fall.  Patient stood up and \"blacked out\" according to son at the bedside.  With patient's history of stroke this is not unusual, however he fell onto his right side and is having pain in his right shoulder which prompted this visit to the emergency department this morning.  Patient does have multivessel stenosis in his neck and son reports they cannot use anything that will constrict the vessels and they were not sure what they can get for pain.  Patient is complaining of pain only in his right shoulder, no other complaints currently.    Objective:     Past Medical History:    ETOH abuse    High blood pressure    Stroke (HCC)    Tobacco abuse              History reviewed. No pertinent surgical history.             Social History     Socioeconomic History    Marital status:    Tobacco Use    Smoking status: Every Day     Current packs/day: 1.00     Average packs/day: 1 pack/day for 20.0 years (20.0 ttl pk-yrs)     Types: Cigarettes    Smokeless tobacco: Never    Tobacco comments:     also smokes marijuana   Substance and Sexual Activity    Alcohol use: Yes     Alcohol/week: 9.0 standard drinks of alcohol     Types: 9 Cans of beer per week     Comment: 6 jimmy of beer a week    Drug use: Yes     Types: Cannabis   Social History Narrative    Lives with son    Work in [employer]                  Physical Exam     ED Triage Vitals [10/10/24 0839]   BP (!) 165/90   Pulse 88   Resp 18   Temp 97.6 °F (36.4 °C)   Temp src Oral   SpO2 98 %   O2 Device None (Room air)       Current Vitals:   Vital Signs  BP: (!) 152/92  Pulse: 83  Resp: 18  Temp: 97.6 °F (36.4 °C)  Temp src: Oral  MAP (mmHg): (!) 111    Oxygen Therapy  SpO2: 98 %  O2  Device: None (Room air)        Physical Exam  Vitals and nursing note reviewed.   Constitutional:       General: He is not in acute distress.     Appearance: He is well-developed.   HENT:      Head: Normocephalic and atraumatic.   Eyes:      Conjunctiva/sclera: Conjunctivae normal.   Cardiovascular:      Rate and Rhythm: Normal rate and regular rhythm.      Heart sounds: Normal heart sounds.   Pulmonary:      Effort: Pulmonary effort is normal. No respiratory distress.      Breath sounds: Normal breath sounds.   Abdominal:      General: Bowel sounds are normal.      Palpations: Abdomen is soft.      Tenderness: There is no abdominal tenderness.   Musculoskeletal:      Cervical back: Normal range of motion and neck supple.      Comments: Pain with any movement of right shoulder   Skin:     General: Skin is warm and dry.      Findings: No rash.   Neurological:      General: No focal deficit present.      Mental Status: He is alert and oriented to person, place, and time.             ED Course     Labs Reviewed   CBC WITH DIFFERENTIAL WITH PLATELET - Abnormal; Notable for the following components:       Result Value    WBC 12.3 (*)     HGB 11.2 (*)     HCT 34.1 (*)     RDW-SD 46.9 (*)     Neutrophil Absolute Prelim 10.57 (*)     Neutrophil Absolute 10.57 (*)     Lymphocyte Absolute 0.76 (*)     All other components within normal limits   COMP METABOLIC PANEL (14) - Abnormal; Notable for the following components:    Glucose 132 (*)     Sodium 146 (*)     Potassium 3.1 (*)     BUN/CREA Ratio 8.9 (*)     Calculated Osmolality 303 (*)     Alkaline Phosphatase 119 (*)     All other components within normal limits   POCT GLUCOSE - Abnormal; Notable for the following components:    POC Glucose  153 (*)     All other components within normal limits   TROPONIN I HIGH SENSITIVITY - Normal   MAGNESIUM - Normal   RAINBOW DRAW LAVENDER   RAINBOW DRAW LIGHT GREEN   RAINBOW DRAW BLUE     EKG    Rate, intervals and axes as noted on  EKG Report.  Rate: 69  Rhythm: Sinus Rhythm  Reading: Sinus rhythm, intervals within normal limits, no STEMI                Imaging Results Available and Reviewed while in ED:   CT BRAIN OR HEAD (CPT=70450)   Final Result   PROCEDURE: CT BRAIN OR HEAD (CPT=70450)       COMPARISON: [facility], CT STROKE BRAIN (NO IV)    (CPT=70450), 9/12/2023, 4:32 PM.  [facility], MRI BRAIN    (CPT=70551), 9/14/2023, 12:43 PM.  [facility], CT STROKE    CTA BRAIN/CTA NECK (WIV) (CPT=70496/18723),    9/12/2023, 5:03 PM.       INDICATIONS: Fall, head injury       TECHNIQUE: CT images were obtained without contrast material.  Automated    exposure control for dose reduction was used.  Dose information is    transmitted to the ACR (American College of Radiology) NRDR (National    Radiology Data Registry) which includes the    Dose Index Registry.        FINDINGS:            Mild-to-moderate global parenchymal volume loss with prominence of the    extra-axial spaces and ventricular system.  The volume loss affects the    posterior fossa with the most notable regions of volume loss involving the    left cerebellar hemisphere , similar    to prior.  No hydrocephalus.  There is no midline shift or mass effect.     The basal cisterns are intact.  There is atherosclerotic calcification of    the carotid siphons.       Unchanged small chronic infarcts involving the right caudate nucleus and    right external capsule .  There are scattered foci and patchy areas of    hypoattenuation involving the periventricular and subcortical white    matter, which likely reflects mild to    moderate chronic microvascular ischemic changes.  No transcortical area of    hypoattenuation.  The gray-white differentiation is maintained.       No acute intracranial hemorrhage or extra-axial fluid collection.       The calvarium is intact.  No displaced calvarial fracture.  The paranasal    sinuses are well aerated.  The  mastoid air cells are clear.  There is    cerumen in the bilateral external auditory canals.  Mild bilateral    temporomandibular joint degenerative    change.  The orbits are unremarkable.                    =====   CONCLUSION:        No acute intracranial hemorrhage or calvarial fracture.       Background of mild-to-moderate global parenchymal volume loss and small    vessel ischemic disease, as well as chronic lacunar infarcts in the right    basal ganglia, similar to prior.         Unchanged asymmetric volume loss in the left cerebellar hemisphere.               Dictated by (CST): Denise Taylor MD on 10/10/2024 at 10:49 AM        Finalized by (CST): Denise Taylor MD on 10/10/2024 at 10:57 AM               XR SHOULDER, COMPLETE (MIN 2 VIEWS), RIGHT (CPT=73030)   Final Result   PROCEDURE: XR SHOULDER, COMPLETE (MIN 2 VIEWS), RIGHT (CPT=73030)       COMPARISON: [facility], XR HUMERUS (MIN 2 VIEWS), RIGHT    (CPT=73060), 10/10/2024, 10:02 AM.       INDICATIONS: Right proximal shoulder pain post fall.       TECHNIQUE: 3 views were obtained.         FINDINGS:    BONES: There is a slightly displaced fracture of the proximal humeral    metadiaphysis.  No osseous malalignment.  Mild degenerative change noted.     Multiple chronic appearing right rib fractures some of which appear in    nonunited.   SOFT TISSUES: Negative. No visible soft tissue swelling.    EFFUSION: None visible.    OTHER: Negative.                    =====   CONCLUSION:        Slightly displaced fracture proximal humeral metadiaphysis.               Dictated by (CST): Johnathon Roberson MD on 10/10/2024 at 10:31 AM        Finalized by (CST): Johnathon Roberson MD on 10/10/2024 at 10:33 AM               XR HUMERUS (MIN 2 VIEWS), RIGHT (CPT=73060)   Final Result   PROCEDURE: XR HUMERUS (MIN 2 VIEWS), RIGHT (CPT=73060)       COMPARISON: None.       INDICATIONS: Right proximal upper arm pain post fall.       TECHNIQUE: 2 views were  obtained.         FINDINGS:    BONES: Slightly displaced fracture of the proximal humeral metadiaphysis.     No osseous malalignment.  Mild degenerative change.  Chronic right rib    fractures.   SOFT TISSUES: Negative. No visible soft tissue swelling.    EFFUSION: None visible.    OTHER: Negative.                    =====   CONCLUSION:        Slightly displaced fracture proximal humeral metadiaphysis.               Dictated by (CST): Johnathon Roberson MD on 10/10/2024 at 10:33 AM        Finalized by (CST): Johnathon Roberson MD on 10/10/2024 at 10:33 AM                   ED Medications Administered:   Medications   morphINE PF 2 MG/ML injection 2 mg (2 mg Intravenous Given 10/10/24 0971)   potassium chloride (Klor-Con) 20 MEQ oral powder 40 mEq (40 mEq Oral Given 10/10/24 1213)         Vitals:    10/10/24 1030 10/10/24 1130 10/10/24 1245 10/10/24 1330   BP: (!) 154/95 (!) 150/92 (!) 152/92    Pulse: 67 63 72 83   Resp: 12 14 18 18   Temp:       TempSrc:       SpO2: 96% 97% 98% 98%   Weight:       Height:         *I personally reviewed and interpreted all ED vitals.         MDM    Medical Decision Making  Patient with near syncopal versus syncopal fall, son reports this is not unusual for him, patient's primary care doctor, Dr. Gray agrees with this.  Primary concern for this visit is right shoulder pain, x-ray as above.  Discussed extensively with Dr. Wilhelm and the patient's son, skilled nursing facility/acute rehab offered and set up by , however patient's son declines, prefers to take the patient home and will continue to look into options.    Problems Addressed:  Other closed displaced fracture of proximal end of right humerus, initial encounter: acute illness or injury    Amount and/or Complexity of Data Reviewed  External Data Reviewed: labs.     Details: Mild hypokalemia, slight leukocytosis, otherwise CBC and BMP stable compared to 9/15/2023  Radiology: ordered and independent interpretation  performed.     Details: Proximal right humerus fracture noted, other and incidental findings deferred to radiology  Discussion of management or test interpretation with external provider(s): Discussed humerus fracture with Dr. Small, discussed disposition with Dr. Gray    Risk  Prescription drug management.  Parenteral controlled substances.        Disposition and Plan     Clinical Impression:  1. Other closed displaced fracture of proximal end of right humerus, initial encounter         Disposition:  Discharge  10/10/2024  1:15 pm    Follow-up:  Lamont Wilhelm MD  [address]  [facility] [address]  [phone]    Schedule an appointment as soon as possible for a visit  For follow up    We recommend that you schedule follow up care with a primary care provider within the next three months to obtain basic health screening including reassessment of your blood pressure.      Medications Prescribed:  Discharge Medication List as of 10/10/2024  2:22 PM        START taking these medications    Details   HYDROcodone-acetaminophen 5-325 MG Oral Tab Take 1 tablet by mouth every 6 (six) hours as needed for Pain (Do not exceed 8 tabs per day.)., Normal, Disp-16 tablet, R-0                 Supplementary Documentation:

## 2024-10-10 NOTE — CM/SW NOTE
Met with son and patient at [facility]  Patient son lives with patient  Patient has a private caregiver during the daytime (the time that son works)  Son also states that the [facility] provides about 15 hours a week of caregiving services for his father  Patient requesting medicare to pay for caregiving services    Son states patient has \"medicaid\"    Registration notified and is looking up eligibility    Private caregiver list provided and explained the list is for private pay  Medicare does not pay for caregiving services or care home care    Respite care resources and \"a place for mom\" resources provided  Also community resources provided  Son states \"I am only interested in insurance paying for caregivers because it is costing me too much money out of pocket\"    Explained caregivers are not covered under medicare coverage    If patient has medcaid we can assist with placement in a long term care faciltiy if that is what patient and son wish    Registration pulled up patients medicaid coverage  If patient is not admitted second option may be for LTC  Referrals sent in [facility] completed  Waiting on DON screening [id number] called    Md met with son and patient  Son wants to bring patient home and then go tour the LTC facilities. Son will follow up with LTC facilities from home    Leanna MEDRANO, CCM, MSN    Emergency Room  [facility]  Clinical Transitions Leader  [phone]

## 2024-10-10 NOTE — ED INITIAL ASSESSMENT (HPI)
Patient states he had syncopal episode at 0500 this morning, on plavix and ASA. C/o of right arm pain from fall.

## 2024-10-10 NOTE — ED QUICK NOTES
Pt and son updated on lab results and x-ray results. Aware of plan to wait for CT results. Reporting mild pain, denies needs for further pain medication.

## 2024-10-10 NOTE — DISCHARGE INSTRUCTIONS
Take Tylenol as needed for pain.  If your pain is not relieved with Tylenol  you can take Norco as needed for severe pain.  Each tablet of Norco has 325 milligrams acetaminophen (Tylenol).  Do not take more than 3900 mg acetaminophen (Tylenol) in 1 day.  Do not drink alcohol or drive while taking Norco.  Take an over-the-counter stool softener such as Colace while taking Norco.  Risk of addiction to pain medication increases after 3 days, make sure to use this for shortest duration as possible and only for severe pain.     See Dr. Wilhelm for any follow up concerns.    See Dr. Small if you have any questions regarding the humerus fracture.    Return to the ER if you develop worsening or emergent concerns.

## (undated) NOTE — ED AVS SNAPSHOT
Srini Persaud   MRN: [id number]    Department:  [facility] Emergency Department   Date of Visit:  8/21/2019           Disclosure     Insurance plans vary and the physician(s) referred by the ER may not be covered by your plan.  Please contact CARE PHYSICIAN AT ONCE OR RETURN IMMEDIATELY TO THE EMERGENCY DEPARTMENT. If you have been prescribed any medication(s), please fill your prescription right away and begin taking the medication(s) as directed.   If you believe that any of the medications

## (undated) NOTE — LETTER
[address] [place] [address]  Authorization for Invasive Procedures  Date: ***           Time: ***    {[facility] ivs consent:14953}

## (undated) NOTE — LETTER
Date & Time: 8/21/2019, 9:41 PM  Patient: Pattie Likes  Encounter Provider(s):    Nini Mathew MD       To Whom It May Concern:    Giovani Valera was seen and treated in our department on 8/21/2019.  He should not return to work until August 26

## (undated) NOTE — LETTER
Date & Time: 4/12/2021, 5:30 PM  Patient: Edson Dos Santos  Encounter Provider(s):    Irlanda Alba MD       To Whom It May Concern:    Kelli Eduardo was seen and treated in our department on 4/12/2021. He may return to work 4/13/2021.     If you ha

## (undated) NOTE — LETTER
Hospital Discharge Documentation  Please phone to schedule a hospital follow up appointment.     From: [address] Hospitalist's Office  Phone: [phone]    Patient discharged time/date: 12/7/2019  3:40 PM  Patient discharge disposition:  Home or Self Cardiovascular: S1, S2 normal, no murmur, click, rub or gallop, regular rate and rhythm  Abdominal: soft, non-tender; bowel sounds normal; no masses,  no organomegaly  Extremities: extremities normal, atraumatic, no cyanosis or edema  Psychiatric: calm START taking these medications      Instructions Prescription details   Albuterol Sulfate  (90 Base) MCG/ACT Aers      inhale 2 puff by inhalation route  every 4 hours as needed   Quantity:  1 Inhaler  Refills:  5     folic acid 1 MG Tabs  Commonly Electronically signed by Humble Torres DO on 12/8/2019  5:02 PM   Attribution Winters     [address] - Humble Torres DO on 12/8/2019  4:53 PM   [address] - Humble Torres DO on 12/8/2019  4:59 PM

## (undated) NOTE — LETTER
Hospital Discharge Documentation  Please phone to schedule a hospital follow up appointment. From: [address] Hospitalist's Office  Phone: [phone]    Patient discharged time/date: 2023  5:43 PM  Patient discharge disposition:   [facility]       Discharge Summary - D/C Summary        Discharge Summary signed by Aby Macedo MD at 2023  2:50 PM  Version 1 of 1      Author: Aby Macedo MD Service: Hospitalist Author Type: Physician    Filed: 2023  2:50 PM Date of Service: 2023  2:17 PM Status: Signed    : Aby Macedo MD (Physician)           [facility] HOSP [facility]    Discharge Summary    Mahogany Varghese Patient Status:  Inpatient    1960 MRN [id number]   Location [facility] 3W/SW Attending Aby Macedo MD   Hosp Day # 4 PCP Maureen Sidhu MD     Date of Admission: 2023 Disposition: Home or Self Care     Date of Discharge: 23    Admitting Diagnosis: Acute CVA (cerebrovascular accident) [facility]) [I63.9]    Hospital Discharge Diagnoses: TIA, R vertebral artery occlusion, HTN, HL, tobacco use    Lace+ Score: 41  59-90 High Risk  29-58 Medium Risk  0-28   Low Risk. TCM Follow-Up Recommendation:  LACE 29-58: Moderate Risk of readmission after discharge from the hospital.    Problem List: Patient Active Problem List:     Multiple closed fractures of ribs of right side     Closed fracture of multiple ribs of right side, initial encounter     Hydropneumothorax     TIA (transient ischemic attack)     Left carotid artery occlusion     Intracranial vascular stenosis     Occlusion of right vertebral artery     Stenosis of left vertebral artery     Cerebral ventriculomegaly     Cerebral atrophy ([facility] [address]      Reason for Admission: Transient ischemic attack. Physical Exam:    23  1330   BP:    Pulse: 118   Resp:    Temp:      GENERAL:  The patient appeared to be in no distress and was comfortable.   SKIN:  Warm and hydrated  PSYCHIATRIC: Calm and cooperative    HEENT:  Head was atraumatic and normocephalic. Eyes: Sclera was anicteric. Pupils were equal.  Ears: There were no lesions. Nose:  No lesions were noted. NECK:  Supple. There was no JVD. CHEST:  Symmetrical movement on inspiration  CARDIAC: S1 S2+, RRR  LUNGS:  CTAB with decreased entry at bases  ABDOMEN: Non-distended, non-tender, BS+  MUSCULOSKELETAL:  ROM of LUE pain limited, in sling, no other deficits   EXTREMITIES: There was no edema  NEUROLOGIC: Cranial nerves II-XII intact, no focal defecits    History of Present Illness:   Per Dr. Ronak Kearney  Patient is a 27-year-old  male who was climbing the stairs earlier today and he fell down. His found him lying on his left side and with slurred speech and left facial droop. Brought him into the emergency department for evaluation. Within 25 to 30 minutes, his symptoms resolved. He is complaining of left shoulder pain. CBC and chemistry are unremarkable. EKG showed normal sinus rhythm. CT scan of the brain showed no acute findings. CT angiogram of the brain showed complete occlusion of the left internal carotid artery just beyond its origin; patent anterior communicating artery and left A2 anterior cerebral artery constitutes a markedly narrowed left A1 and MCA; occluded V1 segment of right vertebral artery with reconstitution of smaller caliber V2; severe narrowing of left vertebral artery in origin, otherwise patent left vertebral artery. Attenuated distal right posterior cerebral artery suggests mild to moderate diffuse distal disease. Patient was given aspirin in the emergency room, and he will be admitted to the hospital for further management. Hospital Course:   1. Possible transient ischemic attack.   Pt initially with slurred speech and L facial droop  This has resolved  Neurology was following  MRI reviewed -> non-acute with chronic changes=  Cts reviewed  Cont DAPT  Cont PT/OT/SLP -> stable for dc home      2. Complete occlusion of the left carotid artery and significant stenosis of the right vertebral artery. D/w neuro and vascular, no procedure or CEA warranted  Cont DAPT     3. Hypertension. Uncontrolled  Cont meds     4. L proximal minimally displaced humeral fracture  Ortho was consulted  Cont non-operative management   Cont sling   PT/OT  Pain control    The patient has a mobility limitation that significantly impairs their ability to participate in one or more mobility-related activities of daily living (MRADLs) such as toileting, feeding, dressing, grooming, and bathing in a customary location in the home. The patient's mobility limitation cannot be sufficiently resolved by the use of an appropriately fitted cane or walker. The use of a manual wheelchair will significantly improve their ability to participate in MRADLs and they will use it on a regular basis in the home. The patient has not expressed an unwillingness to use the manual wheelchair that is provided in the home. They have sufficient upper extremity function and other physical and mental capabilities needed to safely self-propel the manual wheelchair that is provided in the home during a typical day and the patient has a caregiver who is available, willing, and able to provide assistance with the wheelchair. Consultations: Neuro, ortho    Procedures: none    Complications: none    Discharge Condition: Stable    Discharge Medications:      Discharge Medications        START taking these medications        Instructions Prescription details   amLODIPine 5 MG Tabs  Commonly known as: Norvasc  Start taking on: September 17, 2023      Take 2 tablets (10 mg total) by mouth daily. Quantity: 30 tablet  Refills: 0     aspirin 81 MG Chew  Start taking on: September 17, 2023      Chew 1 tablet (81 mg total) by mouth daily.    Quantity: 30 tablet  Refills: 0     atorvastatin 80 MG Tabs  Commonly known as: Lipitor      Take 1 tablet (80 mg total) by mouth nightly. Quantity: 30 tablet  Refills: 0     clopidogrel 75 MG Tabs  Commonly known as: Plavix  Start taking on: September 17, 2023      Take 1 tablet (75 mg total) by mouth daily. Quantity: 90 tablet  Refills: 0            CHANGE how you take these medications        Instructions Prescription details   lisinopril 10 MG Tabs  Commonly known as: Zestril  Start taking on: September 17, 2023  What changed:   medication strength  how much to take      Take 1 tablet (10 mg total) by mouth daily. Quantity: 30 tablet  Refills: 0               Where to Get Your Medications        These medications were sent to [place] [address] [id number] [facility], [place] - [address], [phone], [phone] [address] [place] [address]      Phone: [phone]   amLODIPine 5 MG Tabs  aspirin 81 MG Chew  atorvastatin 80 MG Tabs  clopidogrel 75 MG Tabs  lisinopril 10 MG Tabs       Greater than 35 minutes spent, >50% spent counseling re: treatment plan and workup     Dicie Rinne.  Riley Oswald MD  9/16/2023      Electronically signed by John Hong MD on 9/16/2023  2:50 PM